# Patient Record
Sex: MALE | Race: WHITE
[De-identification: names, ages, dates, MRNs, and addresses within clinical notes are randomized per-mention and may not be internally consistent; named-entity substitution may affect disease eponyms.]

---

## 2018-02-25 ENCOUNTER — HOSPITAL ENCOUNTER (EMERGENCY)
Dept: HOSPITAL 46 - ED | Age: 28
Discharge: HOME | End: 2018-02-25
Payer: COMMERCIAL

## 2018-02-25 VITALS — WEIGHT: 159.99 LBS | HEIGHT: 69 IN | BODY MASS INDEX: 23.7 KG/M2

## 2018-02-25 DIAGNOSIS — K52.9: ICD-10-CM

## 2018-02-25 DIAGNOSIS — E86.0: Primary | ICD-10-CM

## 2018-02-25 DIAGNOSIS — F17.200: ICD-10-CM

## 2018-03-02 ENCOUNTER — HOSPITAL ENCOUNTER (INPATIENT)
Dept: HOSPITAL 46 - ED | Age: 28
LOS: 2 days | Discharge: HOME | DRG: 419 | End: 2018-03-04
Attending: SURGERY | Admitting: SURGERY
Payer: COMMERCIAL

## 2018-03-02 VITALS — HEIGHT: 69 IN | BODY MASS INDEX: 23.55 KG/M2 | WEIGHT: 159 LBS

## 2018-03-02 DIAGNOSIS — K66.0: ICD-10-CM

## 2018-03-02 DIAGNOSIS — K80.00: Primary | ICD-10-CM

## 2018-03-02 DIAGNOSIS — E86.0: ICD-10-CM

## 2018-03-03 PROCEDURE — 0FT44ZZ RESECTION OF GALLBLADDER, PERCUTANEOUS ENDOSCOPIC APPROACH: ICD-10-PCS | Performed by: SURGERY

## 2018-03-03 PROCEDURE — 0DNU4ZZ RELEASE OMENTUM, PERCUTANEOUS ENDOSCOPIC APPROACH: ICD-10-PCS | Performed by: SURGERY

## 2018-03-03 PROCEDURE — BF14YZZ FLUOROSCOPY OF GALLBLADDER, BILE DUCTS AND PANCREATIC DUCTS USING OTHER CONTRAST: ICD-10-PCS | Performed by: SURGERY

## 2018-03-08 NOTE — OR
[facility]
                                    [address], [place]  [address]
_________________________________________________________________________________________
                                                                 Signed   
 
 
DATE OF OPERATION:
03/03/2018
 
SURGEON:
Yoel Henao MD
 
PREOPERATIVE DIAGNOSIS:
Acute acalculous cholecystitis.
 
POSTOPERATIVE DIAGNOSIS:
Acute acalculous cholecystitis.
 
PROCEDURES:
1. Laparoscopic cholecystectomy with intraoperative cholangiogram.
2. Laparoscopic lysis of adhesions.
3. Surgeon-directed fluoroscopy.
 
ANESTHESIA:
General endotracheal, Yoel Joyner CRNA and local 10 mL of 0.25% Marcaine with
epinephrine. 
 
INDICATION:
This 28-year-old white male was admitted through the emergency room yesterday with
severe right upper abdominal pain.  He has had pain and problems for the past 2 weeks,
initially thought likely to the flu.  The gallbladder ultrasound performed in his
evaluation in the emergency room confirmed an edematous gallbladder with pericholecystic
fluid, but no stones and possible sludge.  The liver enzymes were relatively normal.  He
was given intravenous fluids, parenteral pain medication, IV antibiotics and so forth
and is admitted at this time to undergo cholecystectomy preferred by laparoscopic
approach.  The risks of bleeding, infection, bile duct injury, need for open procedure,
failure to cure his symptoms and other unforeseen complications were reviewed in detail
with him and his significant other.  He wishes to proceed. 
 
FINDINGS:
Indeed, the gallbladder was tensed and distended and acutely inflamed.  There was
pericholecystic fluid.  The liver itself was normal.  Impressive edema of the
infundibulum of the gallbladder was noted.  The gallbladder once excised showed chronic
inflammatory changes in the mucosa, but no sign of stones or neoplasm.  Intraoperative
cholangiogram was normal.  There were no other findings of concern, though he did have
omental adhesions in the area of the right lower abdomen related to prior appendectomy I
performed on him nearly 20 years ago.  These were taken down with sharp dissection. 
 
 
    Electronically Signed By: YOEL HENAO MD  03/08/18 1412
_________________________________________________________________________________________
PATIENT NAME:     MESSI WILLOUGHBY                
MEDICAL RECORD #: [id number]            OPERATIVE REPORT              
          ACCT #: [id number]  
DATE OF BIRTH:   [birth year]            REPORT #: [id number]      
PHYSICIAN:        YOEL HENAO MD                 
PCP:              DEMETRIA MILLS                 
REPORT IS CONFIDENTIAL AND NOT TO BE RELEASED WITHOUT AUTHORIZATION
 
 
                                  [facility]
                                    [address] [place] [facility]
                                  [place], [place]  [address]
_________________________________________________________________________________________
                                                                 Signed   
 
 
DESCRIPTION OF PROCEDURE:
The patient was brought to the operating room, given a general endotracheal anesthetic.
Preoperative antibiotic Ancef was given by the anesthetist.  Sequential compression
device stockings were in place and heparin subcutaneously administered.  After
satisfactory general endotracheal anesthesia, the abdomen was clipped and prepared with
a chlorhexidine solution and draped sterilely.  An infraumbilical incision was made and
using an open Venecia cannula technique, pneumoperitoneum was achieved to a level of 14
mmHg of carbon dioxide gas.  Intraabdominal inspection showed no sign of ascites or
carcinomatosis.  The gallbladder was tensed and distended and quite obviously inflamed.
The liver was entirely normal.  Three additional trocars were placed in usual
configuration in the subxiphoid, right midclavicular line and anterior axillary line.
Prior to placement of the right-sided trocars omental adhesions, which were adherent to
his right lower abdominal incision from the past were taken down with sharp dissection
freeing the field for safe placement of the trocars.  The gallbladder was tensed, but
was able to be grasped and elevated cephalad.  The marked inflammation and edema of the
infundibulum were noted.  The infundibulum was grasped and retracted laterally and using
blunt and electrocautery dissection, the triangle of Calot was dissected free.
Impressive amount of inflammatory edema of the neck of the gallbladder was noted.
Ultimately, the cystic duct was well identified.  A clip was applied across gallbladder
cystic duct junction and a transverse choledochotomy made in the cystic duct.
Retrograde milking of the duct showed no sign of stone or other abnormality. 
 
An Mederos-type cholangiocatheter was passed in the cystic duct and using surgeon-directed
fluoroscopy, intraoperative cholangiography was undertaken showing free flow of contrast
in biliary tree with prompt emptying into the duodenum.  Good retrograde filling of the
proximal biliary tree was noted.  There was no sign of filling defect or biliary
anomaly. 
 
The catheter was removed and the cystic duct was triply clipped and divided.  The
gallbladder was dissected free in a retrograde fashion using electrocautery.  The
gallbladder was extracted through the infraumbilical port site without problem, opened
on the back table and found to have no stones or even sludge, but inflammatory changes
of the mucosa.  There was no sign of neoplasm.  Irrigation was undertaken in subhepatic
space.  There was no sign of bile leak, bleeding, or other problems.  Excess irrigation
fluid was suctioned free.  The trocars were removed under direct visualization.  Given
his relatively thin body habitus, easy closure of the infraumbilical fascial incision
was undertaken.  All wounds were infiltrated with 0.25% Marcaine with epinephrine for
postoperative analgesic benefit.  The skin was closed with interrupted 3-0 Vicryl. 
 
The patient then had a fair amount of coughing and straining, though still intubated and
palpably I could feel the sutures at the umbilicus "pop."  This was quite suggestive
obviously of closure dehiscence related to excessive straining.  On that basis, the
 
    Electronically Signed By: YOEL HENAO MD  03/08/18 1412
_________________________________________________________________________________________
PATIENT NAME:     MESSI WILLOUGHBY                
MEDICAL RECORD #: [id number]            OPERATIVE REPORT              
          ACCT #: [id number]  
DATE OF BIRTH:   [birth year]            REPORT #: [id number]      
PHYSICIAN:        YOEL HENAO MD                 
PCP:              DEMETRIA MILLS                 
REPORT IS CONFIDENTIAL AND NOT TO BE RELEASED WITHOUT AUTHORIZATION
 
 
                                  [address] [place], [place]  [address]
_________________________________________________________________________________________
                                                                 Signed   
 
 
patient was given anesthetic again, as he was still intubated and relaxation and
examination of the infraumbilical wound undertaken.  The sutures were removed and
complete dehiscence of the fascial edges was noted.  The herniated omentum was replaced
into the abdomen.  The fascial edges were reapproximated with interrupted 0 Vicryl
suture and subcutaneous layer reapproximated with interrupted 3-0 Vicryl and skin closed
once again with interrupted 2-0 Vicryl.  Steri-Strips were applied.  The patient was
extubated more deeply without coughing and had no further straining and no further
problem. 
 
The patient was ultimately taken to recovery room in good condition having suffered no
complications.  Sponge, needle, and instrument counts reported as correct x3. 
 
 
 
            ________________________________________
            MD SAGRARIO Garcia/CONSUELO
Job #:  [id number]
DD:  03/03/2018 11:21:18
DT:  03/03/2018 13:58:46
 
cc:            Jareth Jarrett DO
 
 
Copies:  JARETH TREADWELL JAMES ALAN DO
~
 
 
 
 
 
 
 
 
 
 
 
 
    Electronically Signed By: YOEL HENAO MD  03/08/18 1412
_________________________________________________________________________________________
PATIENT NAME:     MESSI WILLOUGHBY                
MEDICAL RECORD #: [id number]            OPERATIVE REPORT              
          ACCT #: [id number]  
DATE OF BIRTH:   [birth year]            REPORT #: [id number]      
PHYSICIAN:        YOEL HENAO MD                 
PCP:              DEMETRIA MILLS                 
REPORT IS CONFIDENTIAL AND NOT TO BE RELEASED WITHOUT AUTHORIZATION

## 2018-03-08 NOTE — HP
[facility]
                                    [address], [place]  [address]
_________________________________________________________________________________________
                                                                 Signed   
 
 
ADMISSION DATE:  
03/02/2018
 
REASON FOR ADMISSION:  
Acute acalculous cholecystitis.
 
HISTORY OF PRESENT ILLNESS:  
This 28-year-old white man has had about 2 weeks of epigastric and right subcostal pain
worsened after eating.  He was seen in the emergency room today and evaluated by Dr. Jarrett.  An ultrasound was performed showing findings consistent with acute acalculous
cholecystitis including pericholecystic fluid, a laminated thickened gallbladder wall,
but no evidence of stones.  There was some minimal sludge. 
 
The patient and his wife tell me that he was seen about a week ago in the emergency room
with dehydration and considered likely to have a viral illness akin to those suffered by
other family members at that time. 
 
His previous symptoms over the past 2 weeks, he has had some diarrhea it is noted, but
no myalgias, fever, or other problem.  His dominant problem even over the past 2 weeks
has been epigastric and right subcostal pain.  He denies subscapular pain on the right
side. 
 
He is admitted for further evaluation and care.
 
PAST MEDICAL HISTORY:  
Significant for appendectomy, which he tells me I performed when he was a child.  As
best he recalls, he was about 9 years old, which would be about 19 years ago.  I must
say time flies.  I did not recognize him from his childhood particularly. 
 
He has no other ongoing medical problems and takes no medications.  He does use
marijuana.  He does not smoke and denies alcohol use. 
 
Past medical history, in addition to appendectomy, has included kidney stones he says.
 
SOCIAL HISTORY:  
He is unemployed.  He was covered under [place] [employer].  He has, in aggregate five
children.  His life partner is Glenda Mata, who attends to him at this time.  His
primary provider is Dr. Alvarado oCles. 
 
REVIEW OF SYSTEMS:  
He denies any shortness of breath or chest pain.  He is rather thirsty.  He has had no
hematemesis or blood per rectum.  Denies dysuria or hematuria. 
 
    Electronically Signed By: YOEL HENAO MD  03/08/18 1412
_________________________________________________________________________________________
PATIENT NAME:     MESSI WILLOUGHBY                
MEDICAL RECORD #: [id number]            HISTORY AND PHYSICAL          
          ACCT #: [id number]  
DATE OF BIRTH:   [birth year]            REPORT #: [id number]      
PHYSICIAN:        YOEL HENAO MD                 
PCP:              ALVARADO COLES                 
REPORT IS CONFIDENTIAL AND NOT TO BE RELEASED WITHOUT AUTHORIZATION
 
 
                                  [facility]
                                    [address], [place]  [address]
_________________________________________________________________________________________
                                                                 Signed   
 
 
 
PHYSICAL EXAMINATION:
GENERAL:  A relatively muscular white man, who does not look systemically toxic at this
time.  He is rather pale in color and has several tattoos.  He shows no evidence of
jaundice.  Mucous membranes are markedly dry.  Trachea is midline. 
CHEST:  Clear. 
HEART:  Regular without murmur. 
ABDOMEN:  Nondistended.  There is no sign of ascites.  He does have tenderness in the
epigastric and right subcostal areas. 
EXTREMITIES:  No clubbing, cyanosis, or edema.
 
LABORATORY DATA:  
Show white count of 7.3, hematocrit 41.9, platelets 250,000.  Electrolytes are normal.
Liver enzymes showed bilirubin of 0.5, alkaline phosphatase of 59, AST of 30, ALT 39,
alkaline phosphatase of 59.  His abdominal ultrasound was performed shows a thickened
gallbladder wall, but no evidence of stone.  It is uncertain if there is debris within
the lumen of the gallbladder. 
 
ASSESSMENT:  
The patient has clinical and ultrasonographic evidence of acute acalculous
cholecystitis.  He does have clinical dehydration at this time as well.  We discussed in
detail the pathophysiology of biliary disease both calculous and acalculous.  I would
recommend fluid resuscitation, parenteral pain medication, avoidance of oral intake
other than some sips of liquids for comfort, anticipating more fluid resuscitation and
cholecystectomy preferred by a laparoscopic approach.  The risks of bleeding, infection,
bile duct injury, need for open procedure, and of course failure to cure of his symptoms
were reviewed in detail. 
 
It is more likely that he has had acute cholecystitis for the past 2 weeks and the flu
as was presumed previously.  We will anticipate operative intervention when the patient
adequately resuscitated. 
 
 
 
            ________________________________________
            Yoel Henao MD 
 
 
JM/MODL
Job #:  [id number]
DD:  03/02/2018 16:23:02
DT:  03/02/2018 22:34:22
 
    Electronically Signed By: YOEL HENAO MD  03/08/18 1412
_________________________________________________________________________________________
PATIENT NAME:     MESSI WILLOUGHBY                
MEDICAL RECORD #: [id number]            HISTORY AND PHYSICAL          
          ACCT #: [id number]  
DATE OF BIRTH:   [birth year]            REPORT #: [id number]      
PHYSICIAN:        YOEL HENAO MD                 
PCP:              ALVARADO COLES                 
REPORT IS CONFIDENTIAL AND NOT TO BE RELEASED WITHOUT AUTHORIZATION
 
 
                                  [facility]
                                    [address] [place] [address]  [address]
_________________________________________________________________________________________
                                                                 Signed   
 
 
 
cc:            Alvarado Jarrett DO
 
 
Copies:  ALVARADO COLES JAMES ALAN DO
~
 
 
 
 
 
 
 
 
 
 
 
 
 
 
 
 
 
 
 
 
 
 
 
 
 
 
 
 
 
 
 
 
 
 
    Electronically Signed By: YEOL HENAO MD  03/08/18 1412
_________________________________________________________________________________________
PATIENT NAME:     MESSI WILLOUGHBY                
MEDICAL RECORD #: [id number]            HISTORY AND PHYSICAL          
          ACCT #: [id number]  
DATE OF BIRTH:   [birth year]            REPORT #: [id number]      
PHYSICIAN:        YOEL HENAO MD                 
PCP:              ALVARADO COLES                 
REPORT IS CONFIDENTIAL AND NOT TO BE RELEASED WITHOUT AUTHORIZATION

## 2018-03-08 NOTE — DS
[facility]
                                    [address] [place], [place]  [address]
_________________________________________________________________________________________
                                                                 Signed   
 
 
ADMISSION DATE:  03/02/2018
 
DISCHARGE DATE:  03/04/2018
 
REASON FOR ADMISSION:
This 28-year-old white man was admitted to the emergency room on March 2nd with severe
right upper abdominal pain.  He has had pain more or less for over 2 weeks.  He was
thought to have the flu initially.  His evaluation in the emergency room included
gallbladder ultrasound confirming marked inflammation and edema of the gallbladder wall,
but no stones.  There is pericholecystic fluid as well.  There is no sign of ductal
dilatation.  He was admitted for further evaluation and care, which included intravenous
antibiotic administration, parental pain medication, fluid resuscitation, and
anticipating cholecystectomy. 
 
PAST MEDICAL HISTORY:
Includes appendectomy, which I performed at age 9 (19 years ago).  He is otherwise in
good health. 
 
PERTINENT PHYSICAL EXAMINATION:
GENERAL:  Muscular white male with multiple tattoos, accompanied by his significant
other girlfriend. 
HEENT:  Trachea is midline. 
CHEST:  Clear. 
HEART:  Regular without murmur. 
ABDOMEN:  Nondistended.  Marked tenderness is noted in the epigastric and right
subcostal area.  There is no ascites. 
EXTREMITIES:  Show no clubbing, cyanosis, or edema.
 
LABORATORY DATA:
His white count was only mildly elevated to 11.5.  Bilirubin was normal.  Alkaline
phosphatase was normal.  AST and ALT slightly elevated. 
 
HOSPITAL COURSE:
He was fluid resuscitated, given intravenous antibiotics and on March 3, 2018 on
Saturday, he underwent laparoscopic cholecystectomy with intraoperative cholangiogram.
He was found to have a markedly inflamed and thickened gallbladder with pericholecystic
inflammation.  The liver was normal.  The cholangiogram was normal.  The gallbladder
once opened showed chronic and subacute inflammatory change of the mucosa, but no sign
of stones or neoplasm. 
 
His postoperative course was completely unremarkable.  He is able to eat solid food soon
after operation.  By day of discharge, he is ambulating well and tolerating a regular
 
    Electronically Signed By: YOEL HENAO MD  03/08/18 1412
_________________________________________________________________________________________
PATIENT NAME:     MESSI WILLOUGHBY                
MEDICAL RECORD #: [id number]            DISCHARGE SUMMARY             
          ACCT #: [id number]  
DATE OF BIRTH:   [birth year]            REPORT #: [id number]      
PHYSICIAN:        YOEL HENAO MD                 
PCP:              DEMETRIA MILLS                 
REPORT IS CONFIDENTIAL AND NOT TO BE RELEASED WITHOUT AUTHORIZATION
 
 
                                  [facility]
                                    [address] [place], [place]  [address]
_________________________________________________________________________________________
                                                                 Signed   
 
 
diet.  Incisions are healing nicely.  He is having no problems. 
 
DISCHARGE MEDICATIONS:
His discharge medications will include:
1. Elmer 5/325, 1 to 2 p.o. q.4 hours p.r.n. pain, #10.
2. Ibuprofen 600 mg p.o. q.6 hours p.r.n. pain, #60.
3. Tylenol 650 mg p.o. q.6 hours p.r.n., #30. 
He will likely be able to discontinue Zofran and promethazine, which were prescribed
previously for his nausea problem. 
 
DISCHARGE DIAGNOSES:
1. Acute acalculous cholecystitis, status post laparoscopic cholecystectomy with
intraoperative cholangiogram on march 4, 2018. 
2. Distant history of appendectomy (open appendectomy, age 9).
 
FOLLOWUP PLAN:
He is to return to see me in approximately 4 weeks or so.  He is asked to avoid lifting
more than 20 pounds for the next 2 weeks.  He is permitted to shower tomorrow.  We will
keep Steri-Strips on. 
 
 
 
            ________________________________________
            Yoel Henao MD 
 
 
JM/MODL
Job #:  [id number]
DD:  03/04/2018 11:20:12
DT:  03/04/2018 12:39:25
 
cc:            HANNY Guerrero Dr.
 
 
Copies:  JUANA TREADWELL
 
 
 
 
    Electronically Signed By: YOEL HENAO MD  03/08/18 1412
_________________________________________________________________________________________
PATIENT NAME:     MESSI WILLOUGHBY                
MEDICAL RECORD #: [id number]            DISCHARGE SUMMARY             
          ACCT #: [id number]  
DATE OF BIRTH:   [birth year]            REPORT #: [id number]      
PHYSICIAN:        YOEL HENAO MD                 
PCP:              DEMETRIA MILLS                 
REPORT IS CONFIDENTIAL AND NOT TO BE RELEASED WITHOUT AUTHORIZATION
 
 
                                  [facility]
                                    [address] [place], [place]  [address]
_________________________________________________________________________________________
                                                                 Signed   
 
 
         TAMICA YEUNG

 
 
 
 
 
 
 
 
 
 
 
 
 
 
 
 
 
 
 
 
 
 
 
 
 
 
 
 
 
 
 
 
 
 
 
 
 
 
 
 
 
    Electronically Signed By: YOEL HENAO MD  03/08/18 1412
_________________________________________________________________________________________
PATIENT NAME:     MESSI WILLOUGHBY                
MEDICAL RECORD #: [id number]            DISCHARGE SUMMARY             
          ACCT #: [id number]  
DATE OF BIRTH:   [birth year]            REPORT #: [id number]      
PHYSICIAN:        YOEL HENAO MD                 
PCP:              DEMETRIA MILLS                 
REPORT IS CONFIDENTIAL AND NOT TO BE RELEASED WITHOUT AUTHORIZATION

## 2018-03-30 ENCOUNTER — HOSPITAL ENCOUNTER (EMERGENCY)
Dept: HOSPITAL 46 - ED | Age: 28
Discharge: HOME | End: 2018-03-30
Payer: COMMERCIAL

## 2018-03-30 VITALS — BODY MASS INDEX: 23.62 KG/M2 | HEIGHT: 70 IN | WEIGHT: 164.99 LBS

## 2018-03-30 DIAGNOSIS — M54.5: Primary | ICD-10-CM

## 2018-07-10 ENCOUNTER — HOSPITAL ENCOUNTER (OUTPATIENT)
Dept: HOSPITAL 46 - DS | Age: 28
Discharge: HOME | End: 2018-07-10
Attending: ORTHOPAEDIC SURGERY
Payer: COMMERCIAL

## 2018-07-10 VITALS — WEIGHT: 170 LBS | HEIGHT: 67 IN | BODY MASS INDEX: 26.68 KG/M2

## 2018-07-10 DIAGNOSIS — L72.0: Primary | ICD-10-CM

## 2018-07-10 PROCEDURE — 0HBGXZZ EXCISION OF LEFT HAND SKIN, EXTERNAL APPROACH: ICD-10-PCS | Performed by: ORTHOPAEDIC SURGERY

## 2018-07-10 NOTE — NUR
07/10/18 1230 Cherise Staley 1218 PT ARRIVED IN PACU VERY SLEEPY. AROUSES TO VERBAL AND TACTILE
STIMULI, THEN FALLS RIGHT BACK TO SLEEP.

## 2018-07-10 NOTE — OR
[facility]
                                    [address] [place], [place]  [address]
_________________________________________________________________________________________
                                                                 Draft    
 
 
DATE OF OPERATION:
07/10/2018
 
SURGEON:
Antoine Valdes MD
 
PREOPERATIVE DIAGNOSIS:
Mass left index finger.
 
POSTOPERATIVE DIAGNOSIS:
Mass left index finger, suspect epithelial inclusion cyst.
 
ANESTHESIA:
[place] block.
 
SPECIMENS AND COMPLICATIONS:
There were no complications.  We did send the excised mass as a single specimen.
 
WHAT WAS DONE:
The patient was taken to the operating room.  After anesthesia was induced and the
patient gently sedated, he was positioned, prepped and draped in a routine sterile
fashion.  Using a modified Joanne incision over the middle phalanx of the left index
finger based on the ulnar side, the skin was divided sharply.  A full-thickness flap was
then raised.  The ulnar neurovascular bundle was identified and gently retracted and the
mass itself appeared to be an epithelial inclusion cyst.  We then able to nucleate the
cyst from the surrounding tissue and delivered it off the field.  The wound was gently
irrigated, closed in standard fashion.  A sterile dressing applied.  He was awakened and
taken to the recovery room where he arrived in stable condition.  Counts were correct
and antibiotic protocols were followed. 
 
 
 
            ________________________________________
            MD ALLISON Sigala/MODL
Job #:  [id number]
DD:  07/10/2018 12:23:31
DT:  07/10/2018 17:30:14
 
 
 
                                                                                    
_________________________________________________________________________________________
PATIENT NAME:     MESSI WILLOUGHBY                
MEDICAL RECORD #: [id number]            OPERATIVE REPORT              
          ACCT #: [id number]  
DATE OF BIRTH:   [birth year]            REPORT #: [id number]      
PHYSICIAN:        ANTOINE VALDES MD      
PCP:              [facility]           
REPORT IS CONFIDENTIAL AND NOT TO BE RELEASED WITHOUT AUTHORIZATION
 
 
                                  [address]  [address]
_________________________________________________________________________________________
                                                                 Draft    
 
 
Copies:                                
~
 
 
 
 
 
 
 
 
 
 
 
 
 
 
 
 
 
 
 
 
 
 
 
 
 
 
 
 
 
 
 
 
 
 
 
 
 
 
 
 
 
                                                                                    
_________________________________________________________________________________________
PATIENT NAME:     MESSI WILLOUGHBY                
MEDICAL RECORD #: [id number]            OPERATIVE REPORT              
          ACCT #: [id number]  
DATE OF BIRTH:   [birth year]            REPORT #: [id number]      
PHYSICIAN:        ANTOINE VALDES MD      
PCP:              [facility]           
REPORT IS CONFIDENTIAL AND NOT TO BE RELEASED WITHOUT AUTHORIZATION

## 2018-08-18 ENCOUNTER — HOSPITAL ENCOUNTER (EMERGENCY)
Dept: HOSPITAL 46 - ED | Age: 28
End: 2018-08-18
Payer: COMMERCIAL

## 2018-08-18 VITALS — HEIGHT: 67 IN | WEIGHT: 170 LBS | BODY MASS INDEX: 26.68 KG/M2

## 2018-08-18 DIAGNOSIS — Z87.891: ICD-10-CM

## 2018-08-18 DIAGNOSIS — N45.1: Primary | ICD-10-CM

## 2019-01-13 ENCOUNTER — HOSPITAL ENCOUNTER (EMERGENCY)
Dept: HOSPITAL 46 - ED | Age: 29
Discharge: HOME | End: 2019-01-13
Payer: COMMERCIAL

## 2019-01-13 VITALS — HEIGHT: 67 IN | BODY MASS INDEX: 26.68 KG/M2 | WEIGHT: 170 LBS

## 2019-01-13 DIAGNOSIS — Z87.891: ICD-10-CM

## 2019-01-13 DIAGNOSIS — R56.9: Primary | ICD-10-CM
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
## 2019-01-13 DIAGNOSIS — Z23: ICD-10-CM

## 2019-01-13 DIAGNOSIS — Z87.442: ICD-10-CM

## 2019-01-13 DIAGNOSIS — S01.21XA: ICD-10-CM

## 2019-01-13 PROCEDURE — 0WQ2XZZ REPAIR FACE, EXTERNAL APPROACH: ICD-10-PCS

## 2019-01-13 PROCEDURE — G0480 DRUG TEST DEF 1-7 CLASSES: HCPCS

## 2019-03-08 ENCOUNTER — HOSPITAL ENCOUNTER (EMERGENCY)
Dept: HOSPITAL 46 - ED | Age: 29
Discharge: HOME | End: 2019-03-08
Payer: COMMERCIAL

## 2019-03-08 VITALS — WEIGHT: 184.99 LBS | BODY MASS INDEX: 29.03 KG/M2 | HEIGHT: 67 IN

## 2019-03-08 DIAGNOSIS — Z87.891: ICD-10-CM

## 2019-03-08 DIAGNOSIS — Z87.442: ICD-10-CM

## 2019-03-08 DIAGNOSIS — R51: Primary | ICD-10-CM

## 2019-03-08 DIAGNOSIS — Z79.899: ICD-10-CM

## 2020-06-07 NOTE — XMS
Encounter Summary
  Created on: 2020
 
 Yony Twin Zane
 External Reference #: [id number]
 : [birth year]
 Sex: Male
 
 Demographics
 
 
+-----------------------+--------------------+
| Address               | [address]          |
|                       | [place]  [address] |
+-----------------------+--------------------+
| Home Phone            | [phone]    |
+-----------------------+--------------------+
| Preferred Language    | Unknown            |
+-----------------------+--------------------+
| Marital Status        |             |
+-----------------------+--------------------+
| Sikh Affiliation | Unknown            |
+-----------------------+--------------------+
| Race                  | Unknown            |
+-----------------------+--------------------+
| Ethnic Group          | Unknown            |
+-----------------------+--------------------+
 
 
 Author
 
 
+--------------+--------------------------------------------+
| Author       | [facility]  |
|              | and [place]                                |
+--------------+--------------------------------------------+
| Organization | [facility]  |
|              | and [place]                                |
+--------------+--------------------------------------------+
| Address      | Unknown                                    |
+--------------+--------------------------------------------+
| Phone        | Unavailable                                |
+--------------+--------------------------------------------+
 
 
 
 Support
 
 
+------------------+--------------+--------------------+-----------------+
| Name             | Relationship | Address            | Phone           |
+------------------+--------------+--------------------+-----------------+
| Glenda Bhakta | ECON         | [address]          | [phone] |
|                  |              | [place]  [address] |                 |
+------------------+--------------+--------------------+-----------------+
 
 
 
 
 Care Team Providers
 
 
+-----------------------+------+-----------------+
| Care Team Member Name | Role | Phone           |
+-----------------------+------+-----------------+
| Brittney Wall MD | PCP  | [phone] |
+-----------------------+------+-----------------+
 
 
 
 Encounter Details
 
 
+--------+-------------+----------------------+--------------------+-------------+
| Date   | Type        | Department           | Care Team          | Description |
+--------+-------------+----------------------+--------------------+-------------+
| / | Orders Only |   [facility]     |   Conversion       |             |
| 2019   |             | CONVERSION DEP  888  | Transaction,       |             |
|        |             | [facility]O           | Provider Unknown   |             |
|        |             | [facility]         | [phone]       |             |
|        |             | [address]           | [phone] (Fax) |             |
|        |             | [phone]         |                    |             |
+--------+-------------+----------------------+--------------------+-------------+
 
 
 
 Social History
 
 
+----------------+-------+-----------+--------+------+
| Tobacco Use    | Types | Packs/Day | Years  | Date |
|                |       |           | Used   |      |
+----------------+-------+-----------+--------+------+
| Never Assessed |       |           |        |      |
+----------------+-------+-----------+--------+------+
 
 
 
+------------------+---------------+
| Sex Assigned at  | Date Recorded |
| Birth            |               |
+------------------+---------------+
| Not on file      |               |
+------------------+---------------+
 
 
 
+----------------+-------------+-------------+
| Job Start Date | Occupation  | Industry    |
+----------------+-------------+-------------+
| Not on file    | Not on file | Not on file |
+----------------+-------------+-------------+
 
 
 
+----------------+--------------+------------+
| Travel History | Travel Start | Travel End |
+----------------+--------------+------------+
 
 
 
 
+-------------------------------------+
| No recent travel history available. |
+-------------------------------------+
 documented as of this encounter
 
 Plan of Treatment
 Not on filedocumented as of this encounter
 
 Visit Diagnoses
 Not on filedocumented in this encounter

## 2020-06-07 NOTE — XMS
Encounter Summary
  Created on: 2020
 
 Yony Twin Zane
 External Reference #: [id number]
 : [birth year]
 Sex: Male
 
 Demographics
 
 
+-----------------------+--------------------+
| Address               | [address]          |
|                       | [place]  [address] |
+-----------------------+--------------------+
| Home Phone            | [phone]    |
+-----------------------+--------------------+
| Preferred Language    | Unknown            |
+-----------------------+--------------------+
| Marital Status        |             |
+-----------------------+--------------------+
| Oriental orthodox Affiliation | Unknown            |
+-----------------------+--------------------+
| Race                  | Unknown            |
+-----------------------+--------------------+
| Ethnic Group          | Unknown            |
+-----------------------+--------------------+
 
 
 Author
 
 
+--------------+--------------------------------------------+
| Author       | [facility]  |
|              | and [place]                                |
+--------------+--------------------------------------------+
| Organization | [facility]  |
|              | and [place]                                |
+--------------+--------------------------------------------+
| Address      | Unknown                                    |
+--------------+--------------------------------------------+
| Phone        | Unavailable                                |
+--------------+--------------------------------------------+
 
 
 
 Support
 
 
+------------------+--------------+--------------------+-----------------+
| Name             | Relationship | Address            | Phone           |
+------------------+--------------+--------------------+-----------------+
| Glenda Bhakta | ECON         | [address]          | [phone] |
|                  |              | [place]  [address] |                 |
+------------------+--------------+--------------------+-----------------+
 
 
 
 
 Care Team Providers
 
 
+-----------------------+------+-------------+
| Care Team Member Name | Role | Phone       |
+-----------------------+------+-------------+
 PCP  | Unavailable |
+-----------------------+------+-------------+
 
 
 
 Encounter Details
 
 
+--------+-----------+---------------------+----------------------+-------------+
| Date   | Type      | Department          | Care Team            | Description |
+--------+-----------+---------------------+----------------------+-------------+
| / | Hospital  |   [facility]      |   Malina Castro, |             |
|    | Encounter | [facility]OSP[facility]  |  [address]   |             |
|        |           | CENTER  [address]  | [place]  |             |
|        |           | [place]   | [address]  [phone]  |             |
|        |           | [address]          |  [phone] (Fax)  |             |
|        |           | [phone]        |                      |             |
+--------+-----------+---------------------+----------------------+-------------+
 
 
 
 Social History
 
 
+----------------+-------+-----------+--------+------+
| Tobacco Use    | Types | Packs/Day | Years  | Date |
|                |       |           | Used   |      |
+----------------+-------+-----------+--------+------+
| Never Assessed |       |           |        |      |
+----------------+-------+-----------+--------+------+
 
 
 
+------------------+---------------+
| Sex Assigned at  | Date Recorded |
| Birth            |               |
+------------------+---------------+
| Not on file      |               |
+------------------+---------------+
 
 
 
+----------------+-------------+-------------+
| Job Start Date | Occupation  | Industry    |
+----------------+-------------+-------------+
| Not on file    | Not on file | Not on file |
+----------------+-------------+-------------+
 
 
 
+----------------+--------------+------------+
| Travel History | Travel Start | Travel End |
+----------------+--------------+------------+
 
 
 
 
+-------------------------------------+
| No recent travel history available. |
+-------------------------------------+
 documented as of this encounter
 
 Plan of Treatment
 Not on filedocumented as of this encounter
 
 Visit Diagnoses
 Not on filedocumented in this encounter

## 2020-06-07 NOTE — XMS
Clinical Summary
  Created on: 2020
 
 Twin Bhakta
 External Reference #: [id number]
 : 88
 Sex: Male
 
 Demographics
 
 
+-----------------------+--------------------+
| Address               | [address]          |
|                       | [place]  [address] |
+-----------------------+--------------------+
| Home Phone            | [phone]    |
+-----------------------+--------------------+
| Preferred Language    | Unknown            |
+-----------------------+--------------------+
| Marital Status        |             |
+-----------------------+--------------------+
| Orthodox Affiliation | Unknown            |
+-----------------------+--------------------+
| Race                  | Unknown            |
+-----------------------+--------------------+
| Ethnic Group          | Unknown            |
+-----------------------+--------------------+
 
 
 Author
 
 
+--------------+--------------------------------------------+
| Author       | [facility]  |
|              | and [place]                                |
+--------------+--------------------------------------------+
| Organization | [facility]  |
|              | and [place]                                |
+--------------+--------------------------------------------+
| Address      | Unknown                                    |
+--------------+--------------------------------------------+
| Phone        | Unavailable                                |
+--------------+--------------------------------------------+
 
 
 
 Support
 
 
+------------------+--------------+--------------------+-----------------+
| Name             | Relationship | Address            | Phone           |
+------------------+--------------+--------------------+-----------------+
| Glenda Bhakta | ECON         | [address]          | [phone] |
|                  |              | [place]  [address] |                 |
+------------------+--------------+--------------------+-----------------+
 
 
 
 
 Care Team Providers
 
 
+-----------------------+------+-----------------+
| Care Team Member Name | Role | Phone           |
+-----------------------+------+-----------------+
| Brittney Wall MD | PCP  | [phone] |
+-----------------------+------+-----------------+
 
 
 
 Allergies
 No Known Allergies
 
 Medications
 
 
+---------------------+---------------------+-----------+---------+------+------+-------+
| Medication          | Sig                 | Dispensed | Refills | Star | End  | Statu |
|                     |                     |           |         | t    | Date | s     |
|                     |                     |           |         | Date |      |       |
+---------------------+---------------------+-----------+---------+------+------+-------+
|   ibuprofen         | take 1 tablet by    |           | 0       | 02/2 |      | Activ |
| (ADVIL,MOTRIN) 600  | mouth with food or  |           |         | 5/20 |      | e     |
| MG tablet           | milk three times a  |           |         | 19   |      |       |
|                     | day if needed       |           |         |      |      |       |
+---------------------+---------------------+-----------+---------+------+------+-------+
 
 
 
 Active Problems
 Not on file
 
 Family History
 
 
+----------+------+--------+----------+
| Relation | Name | Status | Comments |
+----------+------+--------+----------+
| Father   |      | Alive  |          |
+----------+------+--------+----------+
| Mother   |      | Alive  |          |
+----------+------+--------+----------+
 
 
 
 Social History
 
 
+---------------+-------+-----------+--------+------+
| Tobacco Use   | Types | Packs/Day | Years  | Date |
|               |       |           | Used   |      |
+---------------+-------+-----------+--------+------+
| Former Smoker |       |           |        |      |
+---------------+-------+-----------+--------+------+
 
 
 
+------------------+---------------+
| Sex Assigned at  | Date Recorded |
 
| Birth            |               |
+------------------+---------------+
| Not on file      |               |
+------------------+---------------+
 
 
 
+----------------+-------------+-------------+
| Job Start Date | Occupation  | Industry    |
+----------------+-------------+-------------+
| Not on file    | Not on file | Not on file |
+----------------+-------------+-------------+
 
 
 
+----------------+--------------+------------+
| Travel History | Travel Start | Travel End |
+----------------+--------------+------------+
 
 
 
+-------------------------------------+
| No recent travel history available. |
+-------------------------------------+
 
 
 
 Last Filed Vital Signs
 
 
+-------------------+----------------------+----------------------+----------+
| Vital Sign        | Reading              | Time Taken           | Comments |
+-------------------+----------------------+----------------------+----------+
| Blood Pressure    | 113/76               | 2019  2:04 PM  |          |
|                   |                      | PDT                  |          |
+-------------------+----------------------+----------------------+----------+
| Pulse             | 75                   | 2019  2:04 PM  |          |
|                   |                      | PDT                  |          |
+-------------------+----------------------+----------------------+----------+
| Temperature       | -                    | -                    |          |
+-------------------+----------------------+----------------------+----------+
| Respiratory Rate  | -                    | -                    |          |
+-------------------+----------------------+----------------------+----------+
| Oxygen Saturation | -                    | -                    |          |
+-------------------+----------------------+----------------------+----------+
| Inhaled Oxygen    | -                    | -                    |          |
| Concentration     |                      |                      |          |
+-------------------+----------------------+----------------------+----------+
| Weight            | 82.6 kg (182 lb 3.2  | 2019  2:04 PM  |          |
|                   | oz)                  | PDT                  |          |
+-------------------+----------------------+----------------------+----------+
| Height            | 170.2 cm (5' 7")     | 2019  2:04 PM  |          |
|                   |                      | PDT                  |          |
+-------------------+----------------------+----------------------+----------+
| Body Mass Index   | 28.54                | 2019  2:04 PM  |          |
|                   |                      | PDT                  |          |
+-------------------+----------------------+----------------------+----------+
 
 
 
 
 Plan of Treatment
 
 
+---------------------+-----------+-----------+----------+
| Health Maintenance  | Due Date  | Last Done | Comments |
+---------------------+-----------+-----------+----------+
| Vaccine:            |  |           |          |
| Dtap/Tdap/Td (1 -   | 0         |           |          |
| Tdap)               |           |           |          |
+---------------------+-----------+-----------+----------+
| Vaccine: Influenza  |  |           |          |
| (Season Ended)      | 0         |           |          |
+---------------------+-----------+-----------+----------+
 
 
 
 Results
 Not on filefrom Last 3 Months
 
 Insurance
 
 
+-------------------+--------+-------------+--------+-------------+---------+--------+
| Payer             | Benefi | Subscriber  | Effect | Phone       | Address | Type   |
|                   | t Plan | ID          | nimisha    |             |         |        |
|                   |  /     |             | Dates  |             |         |        |
|                   | Group  |             |        |             |         |        |
+-------------------+--------+-------------+--------+-------------+---------+--------+
| [employer]  | [employer]   | [id number]    | 10/4/2 | [phone] |         | [employer] |
| MEDICAID HMO      | HEALTH |             | 019-Pr | 1           |         | id     |
|                   |  MDCD  |             | esent  |             |         |        |
|                   | HMO OR |             |        |             |         |        |
+-------------------+--------+-------------+--------+-------------+---------+--------+
 
 
 
+----------------+--------+-------------+--------+-------------+-------------------+
| Guarantor Name | Accoun | Relation to | Date   | Phone       | Billing Address   |
|                | t Type |  Patient    | of     |             |                   |
|                |        |             | Birth  |             |                   |
+----------------+--------+-------------+--------+-------------+-------------------+
| Twin Bhakta  | Person | Self        | / |             |   [address]       |
| [place]       | al/Fam |             |    | [phone] | [place] [address] |
|                | rasha[place]    |             |        | 4 (Home)    |                   |
+----------------+--------+-------------+--------+-------------+-------------------+
 
 
 
 Advance Directives
 
 
+-----------+---------------+----------------+-------------+
| Type      | Date Recorded | Patient        | Explanation |
|           |               | Representative |             |
+-----------+---------------+----------------+-------------+
| Power of  |               |                |             |
|   |               |                |             |
+-----------+---------------+----------------+-------------+
| Advance   |               |                |             |
| Directive |               |                |             |
 
+-----------+---------------+----------------+-------------+

## 2020-06-07 NOTE — XMS
Encounter Summary
  Created on: 2020
 
 Yony Twin Zane
 External Reference #: [id number]
 : 88
 Sex: Male
 
 Demographics
 
 
+-----------------------+--------------------+
| Address               | [address]          |
|                       | [place]  [address] |
+-----------------------+--------------------+
| Home Phone            | [phone]    |
+-----------------------+--------------------+
| Preferred Language    | Unknown            |
+-----------------------+--------------------+
| Marital Status        |             |
+-----------------------+--------------------+
| Scientology Affiliation | Unknown            |
+-----------------------+--------------------+
| Race                  | Unknown            |
+-----------------------+--------------------+
| Ethnic Group          | Unknown            |
+-----------------------+--------------------+
 
 
 Author
 
 
+--------------+--------------------------------------------+
| Author       | [facility]  |
|              | and [place]                                |
+--------------+--------------------------------------------+
| Organization | [facility]  |
|              | and [place]                                |
+--------------+--------------------------------------------+
| Address      | Unknown                                    |
+--------------+--------------------------------------------+
| Phone        | Unavailable                                |
+--------------+--------------------------------------------+
 
 
 
 Support
 
 
+------------------+--------------+--------------------+-----------------+
| Name             | Relationship | Address            | Phone           |
+------------------+--------------+--------------------+-----------------+
| Glenda Bhakta | ECON         | [address]          | [phone] |
|                  |              | [place]  [id number] |                 |
+------------------+--------------+--------------------+-----------------+
 
 
 
 
 Care Team Providers
 
 
+-----------------------+------+-------------+
| Care Team Member Name | Role | Phone       |
+-----------------------+------+-------------+
 PCP  | Unavailable |
+-----------------------+------+-------------+
 
 
 
 Encounter Details
 
 
+--------+-----------+--------------------+--------------------+----------------------+
| Date   | Type      | Department         | Care Team          | Description          |
+--------+-----------+--------------------+--------------------+----------------------+
| / | Hospital  |   [facility]   |   Conversion       | Seizure (HCC);       |
| 2019   | Encounter | CENTER             | Transaction,       | Syncope, unspecified |
|        |           | NEURODIAGNOSTICS   | Provider Unknown   |  syncope type        |
|        |           | [address] [facility]      |        |                      |
|        |           | [place], [place]       |  (Fax) |                      |
|        |           | [address]         |                    |                      |
|        |           | [phone]       |                    |                      |
+--------+-----------+--------------------+--------------------+----------------------+
 
 
 
 Social History
 
 
+----------------+-------+-----------+--------+------+
| Tobacco Use    | Types | Packs/Day | Years  | Date |
|                |       |           | Used   |      |
+----------------+-------+-----------+--------+------+
| Never Assessed |       |           |        |      |
+----------------+-------+-----------+--------+------+
 
 
 
+------------------+---------------+
| Sex Assigned at  | Date Recorded |
| Birth            |               |
+------------------+---------------+
| Not on file      |               |
+------------------+---------------+
 
 
 
+----------------+-------------+-------------+
| Job Start Date | Occupation  | Industry    |
+----------------+-------------+-------------+
| Not on file    | Not on file | Not on file |
+----------------+-------------+-------------+
 
 
 
+----------------+--------------+------------+
| Travel History | Travel Start | Travel End |
+----------------+--------------+------------+
 
 
 
 
+-------------------------------------+
| No recent travel history available. |
+-------------------------------------+
 documented as of this encounter
 
 Plan of Treatment
 Not on filedocumented as of this encounter
 
 Visit Diagnoses
 
 
+-------------------------------------+
| Diagnosis                           |
+-------------------------------------+
|   Seizure (HCC)  Other convulsions  |
+-------------------------------------+
|   Syncope, unspecified syncope type |
+-------------------------------------+
 documented in this encounter

## 2020-06-07 NOTE — XMS
Clinical Summary
  Created on: 2020
 
 Twin Bhakta
 External Reference #: [id number]
 : 88
 Sex: Male
 
 Demographics
 
 
+-----------------------+--------------------+
| Address               | [address]          |
|                       | [place]  [address] |
+-----------------------+--------------------+
| Home Phone            | [phone]    |
+-----------------------+--------------------+
| Preferred Language    | Unknown            |
+-----------------------+--------------------+
| Marital Status        |             |
+-----------------------+--------------------+
| Mor[employer] Affiliation | Unknown            |
+-----------------------+--------------------+
| Race                  | Unknown            |
+-----------------------+--------------------+
| Ethnic Group          | Unknown            |
+-----------------------+--------------------+
 
 
 Author
 
 
+--------------+--------------------------------------------+
| Author       | [facility]  |
|              | and [place]                                |
+--------------+--------------------------------------------+
| Organization | [facility]  |
|              | and [place]                                |
+--------------+--------------------------------------------+
| Address      | Unknown                                    |
+--------------+--------------------------------------------+
| Phone        | Unavailable                                |
+--------------+--------------------------------------------+
 
 
 
 Support
 
 
+------------------+--------------+--------------------+-----------------+
| Name             | Relationship | Address            | Phone           |
+------------------+--------------+--------------------+-----------------+
| Glenda Bhakta | ECON         | [address]          | [phone] |
|                  |              | [place]  [address] |                 |
+------------------+--------------+--------------------+-----------------+
 
 
 
 
 Care Team Providers
 
 
+-----------------------+------+-----------------+
| Care Team Member Name | Role | Phone           |
+-----------------------+------+-----------------+
| Brittney Wall MD | PCP  | [phone] |
+-----------------------+------+-----------------+
 
 
 
 Allergies
 No Known Allergies
 
 Medications
 
 
+---------------------+---------------------+-----------+---------+------+------+-------+
| Medication          | Sig                 | Dispensed | Refills | Star | End  | Statu |
|                     |                     |           |         | t    | Date | s     |
|                     |                     |           |         | Date |      |       |
+---------------------+---------------------+-----------+---------+------+------+-------+
|   ibuprofen         | take 1 tablet by    |           | 0       | 02/2 |      | Activ |
| (ADVIL,MOTRIN) 600  | mouth with food or  |           |         | 5/20 |      | e     |
| MG tablet           | milk three times a  |           |         | 19   |      |       |
|                     | day if needed       |           |         |      |      |       |
+---------------------+---------------------+-----------+---------+------+------+-------+
 
 
 
 Active Problems
 Not on file
 
 Family History
 
 
+----------+------+--------+----------+
| Relation | Name | Status | Comments |
+----------+------+--------+----------+
| Father   |      | Alive  |          |
+----------+------+--------+----------+
| Mother   |      | Alive  |          |
+----------+------+--------+----------+
 
 
 
 Social History
 
 
+---------------+-------+-----------+--------+------+
| Tobacco Use   | Types | Packs/Day | Years  | Date |
|               |       |           | Used   |      |
+---------------+-------+-----------+--------+------+
| Former Smoker |       |           |        |      |
+---------------+-------+-----------+--------+------+
 
 
 
+------------------+---------------+
| Sex Assigned at  | Date Recorded |
 
| Birth            |               |
+------------------+---------------+
| Not on file      |               |
+------------------+---------------+
 
 
 
+----------------+-------------+-------------+
| Job Start Date | Occupation  | Industry    |
+----------------+-------------+-------------+
| Not on file    | Not on file | Not on file |
+----------------+-------------+-------------+
 
 
 
+----------------+--------------+------------+
| Travel History | Travel Start | Travel End |
+----------------+--------------+------------+
 
 
 
+-------------------------------------+
| No recent travel history available. |
+-------------------------------------+
 
 
 
 Last Filed Vital Signs
 
 
+-------------------+----------------------+----------------------+----------+
| Vital Sign        | Reading              | Time Taken           | Comments |
+-------------------+----------------------+----------------------+----------+
| Blood Pressure    | 113/76               | 2019  2:04 PM  |          |
|                   |                      | PDT                  |          |
+-------------------+----------------------+----------------------+----------+
| Pulse             | 75                   | 2019  2:04 PM  |          |
|                   |                      | PDT                  |          |
+-------------------+----------------------+----------------------+----------+
| Temperature       | -                    | -                    |          |
+-------------------+----------------------+----------------------+----------+
| Respiratory Rate  | -                    | -                    |          |
+-------------------+----------------------+----------------------+----------+
| Oxygen Saturation | -                    | -                    |          |
+-------------------+----------------------+----------------------+----------+
| Inhaled Oxygen    | -                    | -                    |          |
| Concentration     |                      |                      |          |
+-------------------+----------------------+----------------------+----------+
| Weight            | 82.6 kg (182 lb 3.2  | 2019  2:04 PM  |          |
|                   | oz)                  | PDT                  |          |
+-------------------+----------------------+----------------------+----------+
| Height            | 170.2 cm (5' 7")     | 2019  2:04 PM  |          |
|                   |                      | PDT                  |          |
+-------------------+----------------------+----------------------+----------+
| Body Mass Index   | 28.54                | 2019  2:04 PM  |          |
|                   |                      | PDT                  |          |
+-------------------+----------------------+----------------------+----------+
 
 
 
 
 Plan of Treatment
 
 
+---------------------+-----------+-----------+----------+
| Health Maintenance  | Due Date  | Last Done | Comments |
+---------------------+-----------+-----------+----------+
| Vaccine:            |  |           |          |
| Dtap/Tdap/Td (1 -   | 0         |           |          |
| Tdap)               |           |           |          |
+---------------------+-----------+-----------+----------+
| Vaccine: Influenza  |  |           |          |
| (Season Ended)      | 0         |           |          |
+---------------------+-----------+-----------+----------+
 
 
 
 Results
 Not on filefrom Last 3 Months
 
 Insurance
 
 
+-------------------+--------+-------------+--------+-------------+---------+--------+
| Payer             | Benefi | Subscriber  | Effect | Phone       | Address | Type   |
|                   | t Plan | ID          | nimisha    |             |         |        |
|                   |  /     |             | Dates  |             |         |        |
|                   | Group  |             |        |             |         |        |
+-------------------+--------+-------------+--------+-------------+---------+--------+
| [employer]  | [employer]   | [id number]    | 10/4/2 | [phone] |         | [employer] |
| MEDICAID HMO      | HEALTH |             | 019-Pr | 1           |         | id     |
|                   |  MDCD  |             | esent  |             |         |        |
|                   | HMO OR |             |        |             |         |        |
+-------------------+--------+-------------+--------+-------------+---------+--------+
 
 
 
+----------------+--------+-------------+--------+-------------+-------------------+
| Guarantor Name | [employer]oun | Relation to | Date   | Phone       | Billing Address   |
|                | t Type |  Patient    | of     |             |                   |
|                |        |             | Birth  |             |                   |
+----------------+--------+-------------+--------+-------------+-------------------+
| Twin Bhakta  | Person | Self        | / |             |   [address]       |
| [place]       | al/Fam |             |    | [phone] | [place] [address] |
|                | [place]    |             |        | [address] (Home)    |                   |
+----------------+--------+-------------+--------+-------------+-------------------+
 
 
 
 Advance Directives
 
 
+-----------+---------------+----------------+-------------+
| Type      | Date Recorded | Patient        | Explanation |
|           |               | Representative |             |
+-----------+---------------+----------------+-------------+
| Power of  |               |                |             |
|   |               |                |             |
+-----------+---------------+----------------+-------------+
| Advance   |               |                |             |
| Directive |               |                |             |
 
+-----------+---------------+----------------+-------------+

## 2020-06-07 NOTE — XMS
Encounter Summary
  Created on: 2020
 
 Yony Twin Zane
 External Reference #: [id number]
 : 88
 Sex: Male
 
 Demographics
 
 
+-----------------------+--------------------+
| Address               | [address]          |
|                       | [place]  [address] |
+-----------------------+--------------------+
| Home Phone            | [phone]    |
+-----------------------+--------------------+
| Preferred Language    | Unknown            |
+-----------------------+--------------------+
| Marital Status        |             |
+-----------------------+--------------------+
| Gnosticism Affiliation | Unknown            |
+-----------------------+--------------------+
| Race                  | Unknown            |
+-----------------------+--------------------+
| Ethnic Group          | Unknown            |
+-----------------------+--------------------+
 
 
 Author
 
 
+--------------+--------------------------------------------+
| Author       | [facility]  |
|              | and [place]                                |
+--------------+--------------------------------------------+
| Organization | [facility]  |
|              | and [place]                                |
+--------------+--------------------------------------------+
| Address      | Unknown                                    |
+--------------+--------------------------------------------+
| Phone        | Unavailable                                |
+--------------+--------------------------------------------+
 
 
 
 Support
 
 
+------------------+--------------+--------------------+-----------------+
| Name             | Relationship | Address            | Phone           |
+------------------+--------------+--------------------+-----------------+
| Glenda Bhakta | ECON         | [address]          | [phone] |
|                  |              | [place]  [address] |                 |
+------------------+--------------+--------------------+-----------------+
 
 
 
 
 Care Team Providers
 
 
+-----------------------+------+-------------+
| Care Team Member Name | Role | Phone       |
+-----------------------+------+-------------+
 PCP  | Unavailable |
+-----------------------+------+-------------+
 
 
 
 Encounter Details
 
 
+--------+-----------+----------------------+----------------------+----------------------+
| Date   | Type      | Department           | Care Team            | Description          |
+--------+-----------+----------------------+----------------------+----------------------+
| / | Hospital  |   [facility]     |   Conversion         | Abnormal brain MRI;  |
| 2019   | Encounter | [facility]  [address] | Transaction,         | Spells of decreased  |
|        |           |  [address] | Provider Unknown     | attentiveness        |
|        |           |   ESVIN WONG       | [phone]         |                      |
|        |           | [address]           | [phone] (Fax)   |                      |
|        |           | [phone]         | Ebony Mccloud MD  |                      |
|        |           |                      |  [address] |                      |
|        |           |                      |   [address]            |                      |
|        |           |                      | [place] [address]  |                      |
|        |           |                      |  [phone]        |                      |
|        |           |                      | [phone] (Fax)   |                      |
+--------+-----------+----------------------+----------------------+----------------------+
 
 
 
 Social History
 
 
+----------------+-------+-----------+--------+------+
| Tobacco Use    | Types | Packs/Day | Years  | Date |
|                |       |           | Used   |      |
+----------------+-------+-----------+--------+------+
| Never Assessed |       |           |        |      |
+----------------+-------+-----------+--------+------+
 
 
 
+------------------+---------------+
| Sex Assigned at  | Date Recorded |
| Birth            |               |
+------------------+---------------+
| Not on file      |               |
+------------------+---------------+
 
 
 
+----------------+-------------+-------------+
| Job Start Date | Occupation  | Industry    |
+----------------+-------------+-------------+
| Not on file    | Not on file | Not on file |
+----------------+-------------+-------------+
 
 
 
 
+----------------+--------------+------------+
| Travel History | Travel Start | Travel End |
+----------------+--------------+------------+
 
 
 
+-------------------------------------+
| No recent travel history available. |
+-------------------------------------+
 documented as of this encounter
 
 Last Filed Vital Signs
 
 
+-------------------+------------------+----------------------+----------+
| Vital Sign        | Reading          | Time Taken           | Comments |
+-------------------+------------------+----------------------+----------+
| Blood Pressure    | -                | -                    |          |
+-------------------+------------------+----------------------+----------+
| Pulse             | -                | -                    |          |
+-------------------+------------------+----------------------+----------+
| Temperature       | -                | -                    |          |
+-------------------+------------------+----------------------+----------+
| Respiratory Rate  | -                | -                    |          |
+-------------------+------------------+----------------------+----------+
| Oxygen Saturation | -                | -                    |          |
+-------------------+------------------+----------------------+----------+
| Inhaled Oxygen    | -                | -                    |          |
| Concentration     |                  |                      |          |
+-------------------+------------------+----------------------+----------+
| Weight            | 81.6 kg (180 lb) | 2019  2:25 PM  |          |
|                   |                  | PDT                  |          |
+-------------------+------------------+----------------------+----------+
| Height            | -                | -                    |          |
+-------------------+------------------+----------------------+----------+
| Body Mass Index   | 28.19            | 2019 10:49 AM  |          |
|                   |                  | PDT                  |          |
+-------------------+------------------+----------------------+----------+
 documented in this encounter
 
 Medications at Time of Discharge
 
 
+---------------------+---------------------+-----------+---------+----------+----------+
| Medication          | Sig                 | Dispensed | Refills | Start    | End Date |
|                     |                     |           |         | Date     |          |
+---------------------+---------------------+-----------+---------+----------+----------+
|   ibuprofen         | take 1 tablet by    |           | 0       | 20 |          |
| (ADVIL,MOTRIN) 600  | mouth with food or  |           |         | 19       |          |
| MG tablet           | milk three times a  |           |         |          |          |
|                     | day if needed       |           |         |          |          |
+---------------------+---------------------+-----------+---------+----------+----------+
 documented as of this encounter
 
 Plan of Treatment
 Not on filedocumented as of this encounter
 
 Procedures
 
 
 
+-----------------+--------+-------------+----------------------+----------------------+
| Procedure Name  | Priori | Date/Time   | Associated Diagnosis | Comments             |
|                 | ty     |             |                      |                      |
+-----------------+--------+-------------+----------------------+----------------------+
| MRI BRAIN W WO  | Routin | 2019  |                      |   Results for this   |
| CONTRAST        | e      |  3:25 PM    |                      | procedure are in the |
|                 |        | PDT         |                      |  results section.    |
+-----------------+--------+-------------+----------------------+----------------------+
 documented in this encounter
 
 Results
 MRI Brain w wo Contrast (2019  3:25 PM PDT)
 
+----------+
| Specimen |
+----------+
|          |
+----------+
 
 
 
+-------------------------------------------------------------------+--------------+
| Impressions                                                       | Performed At |
+-------------------------------------------------------------------+--------------+
|   1. No evidence of acute ischemia.  2. Normal contrast enhanced  |              |
| appearance of the brain.  Signed by: MIGUEL Darby Richard  Sign  |              |
| Date/Time: 2019 7:48 AM                                     |              |
+-------------------------------------------------------------------+--------------+
 
 
 
+------------------------------------------------------------------------+--------------+
| Narrative                                                              | Performed At |
+------------------------------------------------------------------------+--------------+
|   MRI BRAIN WITHOUT AND WITH CONTRAST  CLINICAL INFORMATION:  Abnormal |              |
|  brain MRI Spells of decreased attentiveness  COMPARISON:  None        |              |
| PROCEDURE:  Sagittal T1, axial FLAIR, axial T2, axial T1, axial        |              |
| gradient  susceptibility, axial T1 enhanced, coronal T1 enhanced and   |              |
| axial DWI.  Contrast: 8.0ml gadavist IV.  FINDINGS:  Brain: No         |              |
| intracranial hemorrhage. No midline shift, pathologic  enhancement, or |              |
|  mass lesion.   No cerebral edema, restricted diffusion,  or evidence  |              |
| of acute infarct.  Ventricles and extra-axial fluid spaces: Normal.    |              |
| Sella, suprasellar cistern, and orbits: Normal.  Major vascular flow   |              |
| voids: Normal.  Calvarium and extracranial soft tissues: Normal.       |              |
| Paranasal sinuses and mastoid air cells: Mild mucosal thickening is    |              |
| seen of the bilateral maxillary sinus.                                 |              |
+------------------------------------------------------------------------+--------------+
 
 
 
+--------------------------------------------------------------------------------------+
| Procedure Note                                                                       |
+--------------------------------------------------------------------------------------+
|   Fadi [facility] Conversion - 2019 11:16 PM PDT  MRI BRAIN WITHOUT AND WITH CONTRAST |
| CLINICAL INFORMATION:                                                                |
| Abnormal brain MRI Spells of decreased attentiveness                                 |
| COMPARISON:                                                                          |
| None                                                                                 |
| PROCEDURE:                                                                           |
 
| Sagittal T1, axial FLAIR, axial T2, axial T1, axial gradient                         |
| susceptibility, axial T1 enhanced, coronal T1 enhanced and axial DWI.                |
| Contrast: 8.0ml gadavist IV.                                                         |
| FINDINGS:                                                                            |
| Brain: No intracranial hemorrhage. No midline shift, pathologic                      |
| enhancement, or mass lesion.  No cerebral edema, restricted diffusion,               |
| or evidence of acute infarct.                                                        |
| Ventricles and extra-axial fluid spaces: Normal.                                     |
| Sella, suprasellar cistern, and orbits: Normal.                                      |
| Major vascular flow voids: Normal.                                                   |
| Calvarium and extracranial soft tissues: Normal.                                     |
| Paranasal sinuses and mastoid air cells: Mild mucosal thickening is                  |
| seen of the bilateral maxillary sinus.                                               |
| IMPRESSION:                                                                          |
| 1. No evidence of acute ischemia.                                                    |
| 2. Normal contrast enhanced appearance of the brain.                                 |
| Signed by: MIGUEL Darby Richard                                                     |
| Sign Date/Time: 2019 7:48 AM                                                   |
+--------------------------------------------------------------------------------------+
 documented in this encounter
 
 Visit Diagnoses
 
 
+----------------------------------------------------------------------------------+
| Diagnosis                                                                        |
+----------------------------------------------------------------------------------+
|   Abnormal brain MRI  Nonspecific (abnormal) findings on radiological and other  |
| examination of skull and head                                                    |
+----------------------------------------------------------------------------------+
|   Spells of decreased attentiveness  Other general symptoms                      |
+----------------------------------------------------------------------------------+
 documented in this encounter

## 2020-06-07 NOTE — XMS
Encounter Summary
  Created on: 2020
 
 Yony Twin Zane
 External Reference #: [id number]
 : 88
 Sex: Male
 
 Demographics
 
 
+-----------------------+--------------------+
| Address               | [address]          |
|                       | [place]  [address] |
+-----------------------+--------------------+
| Home Phone            | [phone]    |
+-----------------------+--------------------+
| Preferred Language    | Unknown            |
+-----------------------+--------------------+
| Marital Status        |             |
+-----------------------+--------------------+
| Adventist Affiliation | Unknown            |
+-----------------------+--------------------+
| Race                  | Unknown            |
+-----------------------+--------------------+
| Ethnic Group          | Unknown            |
+-----------------------+--------------------+
 
 
 Author
 
 
+--------------+--------------------------------------------+
| Author       | [facility]  |
|              | and [place]                                |
+--------------+--------------------------------------------+
| Organization | [facility]  |
|              | and [place]                                |
+--------------+--------------------------------------------+
| Address      | Unknown                                    |
+--------------+--------------------------------------------+
| Phone        | Unavailable                                |
+--------------+--------------------------------------------+
 
 
 
 Support
 
 
+------------------+--------------+--------------------+-----------------+
| Name             | Relationship | Address            | Phone           |
+------------------+--------------+--------------------+-----------------+
| Glenda Bhakta | ECON         | [address]          | [phone] |
|                  |              | [place]  [address] |                 |
+------------------+--------------+--------------------+-----------------+
 
 
 
 
 Care Team Providers
 
 
+-----------------------+------+-------------+
| Care Team Member Name | Role | Phone       |
+-----------------------+------+-------------+
 PCP  | Unavailable |
+-----------------------+------+-------------+
 
 
 
 Encounter Details
 
 
+--------+-----------+---------------------+----------------------+-------------+
| Date   | Type      | Department          | Care Team            | Description |
+--------+-----------+---------------------+----------------------+-------------+
| / | Hospital  |   [facility]      |   Malina Castro, |             |
|    | Encounter | [facility]  |  [address]   |             |
|        |           | CENTER  [address]  | [place]  |             |
|        |           | [place]   | [address]  [phone]  |             |
|        |           | [address]          |  [phone] (Fax)  |             |
|        |           | [phone]        |                      |             |
+--------+-----------+---------------------+----------------------+-------------+
 
 
 
 Social History
 
 
+----------------+-------+-----------+--------+------+
| Tobacco Use    | Types | Packs/Day | Years  | Date |
|                |       |           | Used   |      |
+----------------+-------+-----------+--------+------+
| Never Assessed |       |           |        |      |
+----------------+-------+-----------+--------+------+
 
 
 
+------------------+---------------+
| Sex Assigned at  | Date Recorded |
| Birth            |               |
+------------------+---------------+
| Not on file      |               |
+------------------+---------------+
 
 
 
+----------------+-------------+-------------+
| Job Start Date | Occupation  | Industry    |
+----------------+-------------+-------------+
| Not on file    | Not on file | Not on file |
+----------------+-------------+-------------+
 
 
 
+----------------+--------------+------------+
| Travel History | Travel Start | Travel End |
+----------------+--------------+------------+
 
 
 
 
+-------------------------------------+
| No recent travel history available. |
+-------------------------------------+
 documented as of this encounter
 
 Plan of Treatment
 Not on filedocumented as of this encounter
 
 Visit Diagnoses
 Not on filedocumented in this encounter

## 2020-06-07 NOTE — XMS
Encounter Summary
  Created on: 2020
 
 Yony Twin Zane
 External Reference #: [id number]
 : 88
 Sex: Male
 
 Demographics
 
 
+-----------------------+--------------------+
| Address               | [address]          |
|                       | [place]  [address] |
+-----------------------+--------------------+
| Home Phone            | [phone]    |
+-----------------------+--------------------+
| Preferred Language    | Unknown            |
+-----------------------+--------------------+
| Marital Status        |             |
+-----------------------+--------------------+
| Adventist Affiliation | Unknown            |
+-----------------------+--------------------+
| Race                  | Unknown            |
+-----------------------+--------------------+
| Ethnic Group          | Unknown            |
+-----------------------+--------------------+
 
 
 Author
 
 
+--------------+--------------------------------------------+
| Author       | [facility]  |
|              | and [place]                                |
+--------------+--------------------------------------------+
| Organization | [facility]  |
|              | and [place]                                |
+--------------+--------------------------------------------+
| Address      | Unknown                                    |
+--------------+--------------------------------------------+
| Phone        | Unavailable                                |
+--------------+--------------------------------------------+
 
 
 
 Support
 
 
+------------------+--------------+--------------------+-----------------+
| Name             | Relationship | Address            | Phone           |
+------------------+--------------+--------------------+-----------------+
| Glenda Bhakta | ECON         | [address]          | [phone] |
|                  |              | [place]  [address] |                 |
+------------------+--------------+--------------------+-----------------+
 
 
 
 
 Care Team Providers
 
 
+-----------------------+------+-------------+
| Care Team Member Name | Role | Phone       |
+-----------------------+------+-------------+
 PCP  | Unavailable |
+-----------------------+------+-------------+
 
 
 
 Encounter Details
 
 
+--------+-----------+--------------------+--------------------+----------------------+
| Date   | Type      | Department         | Care Team          | Description          |
+--------+-----------+--------------------+--------------------+----------------------+
| / | Hospital  |   [facility]   |   Conversion       | Spells of decreased  |
| 2019   | Encounter | CENTER             | Transaction,       | attentiveness        |
|        |           | NEURODIAGNOSTICS   | Provider Unknown   |                      |
|        |           | [address] [facility]      |        |                      |
|        |           | [place], [place]       |  (Fax) |                      |
|        |           | [address]         |                    |                      |
|        |           | [phone]       |                    |                      |
+--------+-----------+--------------------+--------------------+----------------------+
 
 
 
 Social History
 
 
+----------------+-------+-----------+--------+------+
| Tobacco Use    | Types | Packs/Day | Years  | Date |
|                |       |           | Used   |      |
+----------------+-------+-----------+--------+------+
| Never Assessed |       |           |        |      |
+----------------+-------+-----------+--------+------+
 
 
 
+------------------+---------------+
| Sex Assigned at  | Date Recorded |
| Birth            |               |
+------------------+---------------+
| Not on file      |               |
+------------------+---------------+
 
 
 
+----------------+-------------+-------------+
| Job Start Date | Occupation  | Industry    |
+----------------+-------------+-------------+
| Not on file    | Not on file | Not on file |
+----------------+-------------+-------------+
 
 
 
+----------------+--------------+------------+
| Travel History | Travel Start | Travel End |
+----------------+--------------+------------+
 
 
 
 
+-------------------------------------+
| No recent travel history available. |
+-------------------------------------+
 documented as of this encounter
 
 Medications at Time of Discharge
 
 
+---------------------+---------------------+-----------+---------+----------+----------+
| Medication          | Sig                 | Dispensed | Refills | Start    | End Date |
|                     |                     |           |         | Date     |          |
+---------------------+---------------------+-----------+---------+----------+----------+
|   ibuprofen         | take 1 tablet by    |           | 0       | 20 |          |
| (ADVIL,MOTRIN) 600  | mouth with food or  |           |         | 19       |          |
| MG tablet           | milk three times a  |           |         |          |          |
|                     | day if needed       |           |         |          |          |
+---------------------+---------------------+-----------+---------+----------+----------+
 documented as of this encounter
 
 Plan of Treatment
 Not on filedocumented as of this encounter
 
 Visit Diagnoses
 
 
+-------------------------------------------------------------+
| Diagnosis                                                   |
+-------------------------------------------------------------+
|   Spells of decreased attentiveness  Other general symptoms |
+-------------------------------------------------------------+
 documented in this encounter

## 2020-06-07 NOTE — XMS
Encounter Summary
  Created on: 2020
 
 Yony Twin Zane
 External Reference #: [id number]
 : 88
 Sex: Male
 
 Demographics
 
 
+-----------------------+--------------------+
| Address               | [address]          |
|                       | [place]  [address] |
+-----------------------+--------------------+
| Home Phone            | [phone]    |
+-----------------------+--------------------+
| Preferred Language    | Unknown            |
+-----------------------+--------------------+
| Marital Status        |             |
+-----------------------+--------------------+
| Rastafarian Affiliation | Unknown            |
+-----------------------+--------------------+
| Race                  | Unknown            |
+-----------------------+--------------------+
| Ethnic Group          | Unknown            |
+-----------------------+--------------------+
 
 
 Author
 
 
+--------------+--------------------------------------------+
| Author       |  [facility] [place]  |
|              | and [place]                                |
+--------------+--------------------------------------------+
| Organization |  [facility] [place]  |
|              | and [place]                                |
+--------------+--------------------------------------------+
| Address      | Unknown                                    |
+--------------+--------------------------------------------+
| Phone        | Unavailable                                |
+--------------+--------------------------------------------+
 
 
 
 Support
 
 
+------------------+--------------+--------------------+-----------------+
| Name             | Relationship | Address            | Phone           |
+------------------+--------------+--------------------+-----------------+
| Glenda Bhakta | ECON         | [address]          | [phone] |
|                  |              | [place]  [address] |                 |
+------------------+--------------+--------------------+-----------------+
 
 
 
 
 Care Team Providers
 
 
+-----------------------+------+-------------+
| Care Team Member Name | Role | Phone       |
+-----------------------+------+-------------+
 PCP  | Unavailable |
+-----------------------+------+-------------+
 
 
 
 Encounter Details
 
 
+--------+-----------+--------------------+--------------------+----------------------+
| Date   | Type      | Department         | Care Team          | Description          |
+--------+-----------+--------------------+--------------------+----------------------+
| / | Hospital  |   [facility]   |   Conversion       | Seizure (HCC);       |
| 2019   | Encounter | CENTER             | Transaction,       | Syncope, unspecified |
|        |           | NEURODIAGNOSTICS   | Provider Unknown   |  syncope type        |
|        |           | [address] [facility]      |        |                      |
|        |           | [place], [place]       |  (Fax) |                      |
|        |           | [address]         |                    |                      |
|        |           | [phone]       |                    |                      |
+--------+-----------+--------------------+--------------------+----------------------+
 
 
 
 Social History
 
 
+----------------+-------+-----------+--------+------+
| Tobacco Use    | Types | Packs/Day | Years  | Date |
|                |       |           | Used   |      |
+----------------+-------+-----------+--------+------+
| Never Assessed |       |           |        |      |
+----------------+-------+-----------+--------+------+
 
 
 
+------------------+---------------+
| Sex Assigned at  | Date Recorded |
| Birth            |               |
+------------------+---------------+
| Not on file      |               |
+------------------+---------------+
 
 
 
+----------------+-------------+-------------+
| Job Start Date | Occupation  | Industry    |
+----------------+-------------+-------------+
| Not on file    | Not on file | Not on file |
+----------------+-------------+-------------+
 
 
 
+----------------+--------------+------------+
| Travel History | Travel Start | Travel End |
+----------------+--------------+------------+
 
 
 
 
+-------------------------------------+
| No recent travel history available. |
+-------------------------------------+
 documented as of this encounter
 
 Plan of Treatment
 Not on filedocumented as of this encounter
 
 Visit Diagnoses
 
 
+-------------------------------------+
| Diagnosis                           |
+-------------------------------------+
|   Seizure (HCC)  Other convulsions  |
+-------------------------------------+
|   Syncope, unspecified syncope type |
+-------------------------------------+
 documented in this encounter

## 2020-06-07 NOTE — XMS
Encounter Summary
  Created on: 2020
 
 Yony Twin Zane
 External Reference #: [id number]
 : 88
 Sex: Male
 
 Demographics
 
 
+-----------------------+--------------------+
| Address               | [address]          |
|                       | [place]  [address] |
+-----------------------+--------------------+
| Home Phone            | [phone]    |
+-----------------------+--------------------+
| Preferred Language    | Unknown            |
+-----------------------+--------------------+
| Marital Status        |             |
+-----------------------+--------------------+
| Muslim Affiliation | Unknown            |
+-----------------------+--------------------+
| Race                  | Unknown            |
+-----------------------+--------------------+
| Ethnic Group          | Unknown            |
+-----------------------+--------------------+
 
 
 Author
 
 
+--------------+--------------------------------------------+
| Author       | [facility] [place]  |
|              | and [place]                                |
+--------------+--------------------------------------------+
| Organization | [facility]  |
|              | and [place]                                |
+--------------+--------------------------------------------+
| Address      | Unknown                                    |
+--------------+--------------------------------------------+
| Phone        | Unavailable                                |
+--------------+--------------------------------------------+
 
 
 
 Support
 
 
+------------------+--------------+--------------------+-----------------+
| Name             | Relationship | Address            | Phone           |
+------------------+--------------+--------------------+-----------------+
| Glenda Bhakta | ECON         | [address]          | [phone] |
|                  |              | [place]  [address] |                 |
+------------------+--------------+--------------------+-----------------+
 
 
 
 
 Care Team Providers
 
 
+-----------------------+------+-----------------+
| Care Team Member Name | Role | Phone           |
+-----------------------+------+-----------------+
| Brittney Wall MD | PCP  | [phone] |
+-----------------------+------+-----------------+
 
 
 
 Encounter Details
 
 
+--------+-------------+---------------------+----------------------+----------------------+
| Date   | Type        | Department          | Care Team            | Description          |
+--------+-------------+---------------------+----------------------+----------------------+
| / | Orders Only |   [facility]     |   Ebony Mccloud,   | Other symptoms and   |
| 2019   |             | NEUROLOGY  [address]     | MD  [address]    | signs involving      |
|        |             | [address]   | [address]       | cognitive functions  |
|        |             | [place]        | [place] [address]  | and awareness        |
|        |             | [address]          |  [phone]        |                      |
|        |             | [phone]        | [phone] (Fax)   |                      |
+--------+-------------+---------------------+----------------------+----------------------+
 
 
 
 Social History
 
 
+---------------+-------+-----------+--------+------+
| Tobacco Use   | Types | Packs/Day | Years  | Date |
|               |       |           | Used   |      |
+---------------+-------+-----------+--------+------+
| Former Smoker |       |           |        |      |
+---------------+-------+-----------+--------+------+
 
 
 
+------------------+---------------+
| Sex Assigned at  | Date Recorded |
| Birth            |               |
+------------------+---------------+
| Not on file      |               |
+------------------+---------------+
 
 
 
+----------------+-------------+-------------+
| Job Start Date | Occupation  | Industry    |
+----------------+-------------+-------------+
| Not on file    | Not on file | Not on file |
+----------------+-------------+-------------+
 
 
 
+----------------+--------------+------------+
| Travel History | Travel Start | Travel End |
+----------------+--------------+------------+
 
 
 
 
+-------------------------------------+
| No recent travel history available. |
+-------------------------------------+
 documented as of this encounter
 
 Plan of Treatment
 
 
+-------------------+------+--------+----------------------+----------------------+
| Name              | Type | Priori | Associated Diagnoses | Order Schedule       |
|                   |      | ty     |                      |                      |
+-------------------+------+--------+----------------------+----------------------+
| TSH               | Lab  | Routin |   Other symptoms and | Expected:            |
|                   |      | e      |  signs involving     | 2019, Expires: |
|                   |      |        | cognitive functions  |  2020          |
|                   |      |        | and awareness        |                      |
+-------------------+------+--------+----------------------+----------------------+
| Vitamin B-12 and  | Lab  | Routin |   Other symptoms and | Expected:            |
| Folate            |      | e      |  signs involving     | 2019, Expires: |
|                   |      |        | cognitive functions  |  2020          |
|                   |      |        | and awareness        |                      |
+-------------------+------+--------+----------------------+----------------------+
 documented as of this encounter
 
 Visit Diagnoses
 
 
+------------------------------------------------------------------------+
| Diagnosis                                                              |
+------------------------------------------------------------------------+
|   Other symptoms and signs involving cognitive functions and awareness |
+------------------------------------------------------------------------+
 documented in this encounter

## 2020-06-07 NOTE — XMS
Encounter Summary
  Created on: 2020
 
 Yony Twin Zane
 External Reference #: [id number]
 : 88
 Sex: Male
 
 Demographics
 
 
+-----------------------+--------------------+
| Address               | [address]          |
|                       | [place]  [address] |
+-----------------------+--------------------+
| Home Phone            | [phone]    |
+-----------------------+--------------------+
| Preferred Language    | Unknown            |
+-----------------------+--------------------+
| Marital Status        |             |
+-----------------------+--------------------+
| Gnosticist Affiliation | Unknown            |
+-----------------------+--------------------+
| Race                  | Unknown            |
+-----------------------+--------------------+
| Ethnic Group          | Unknown            |
+-----------------------+--------------------+
 
 
 Author
 
 
+--------------+--------------------------------------------+
| Author       | [facility]  |
|              | and [place]                                |
+--------------+--------------------------------------------+
| Organization | [facility]  |
|              | and [place]                                |
+--------------+--------------------------------------------+
| Address      | Unknown                                    |
+--------------+--------------------------------------------+
| Phone        | Unavailable                                |
+--------------+--------------------------------------------+
 
 
 
 Support
 
 
+------------------+--------------+--------------------+-----------------+
| Name             | Relationship | Address            | Phone           |
+------------------+--------------+--------------------+-----------------+
| Glenda Bhakta | ECON         | [address]          | [phone] |
|                  |              | [place]  [address] |                 |
+------------------+--------------+--------------------+-----------------+
 
 
 
 
 Care Team Providers
 
 
+-----------------------+------+-------------+
| Care Team Member Name | Role | Phone       |
+-----------------------+------+-------------+
 PCP  | Unavailable |
+-----------------------+------+-------------+
 
 
 
 Encounter Details
 
 
+--------+-----------+--------------------+--------------------+----------------------+
| Date   | Type      | Department         | Care Team          | Description          |
+--------+-----------+--------------------+--------------------+----------------------+
| / | Hospital  |   [facility] MED[facility]   |   Conversion       | Spells of decreased  |
| 2019   | Encounter | CENTER             | Transaction,       | attentiveness        |
|        |           | NEURODIAGNOSTICS   | Provider Unknown   |                      |
|        |           | [address] [facility]      |        |                      |
|        |           | [place], [place]       |  (Fax) |                      |
|        |           | [address]         |                    |                      |
|        |           | [phone]       |                    |                      |
+--------+-----------+--------------------+--------------------+----------------------+
 
 
 
 Social History
 
 
+----------------+-------+-----------+--------+------+
| Tobacco Use    | Types | Packs/Day | Years  | Date |
|                |       |           | Used   |      |
+----------------+-------+-----------+--------+------+
| Never Assessed |       |           |        |      |
+----------------+-------+-----------+--------+------+
 
 
 
+------------------+---------------+
| Sex Assigned at  | Date Recorded |
| Birth            |               |
+------------------+---------------+
| Not on file      |               |
+------------------+---------------+
 
 
 
+----------------+-------------+-------------+
| Job Start Date | Occupation  | Industry    |
+----------------+-------------+-------------+
| Not on file    | Not on file | Not on file |
+----------------+-------------+-------------+
 
 
 
+----------------+--------------+------------+
| Travel History | Travel Start | Travel End |
+----------------+--------------+------------+
 
 
 
 
+-------------------------------------+
| No recent travel history available. |
+-------------------------------------+
 documented as of this encounter
 
 Medications at Time of Discharge
 
 
+---------------------+---------------------+-----------+---------+----------+----------+
| Medication          | Sig                 | Dispensed | Refills | Start    | End Date |
|                     |                     |           |         | Date     |          |
+---------------------+---------------------+-----------+---------+----------+----------+
|   ibuprofen         | take 1 tablet by    |           | 0       | 20 |          |
| (ADVIL,MOTRIN) 600  | mouth with food or  |           |         | 19       |          |
| MG tablet           | milk three times a  |           |         |          |          |
|                     | day if needed       |           |         |          |          |
+---------------------+---------------------+-----------+---------+----------+----------+
 documented as of this encounter
 
 Plan of Treatment
 Not on filedocumented as of this encounter
 
 Visit Diagnoses
 
 
+-------------------------------------------------------------+
| Diagnosis                                                   |
+-------------------------------------------------------------+
|   Spells of decreased attentiveness  Other general symptoms |
+-------------------------------------------------------------+
 documented in this encounter

## 2020-06-07 NOTE — XMS
Encounter Summary
  Created on: 2020
 
 Yony Twin Zane
 External Reference #: [id number]
 : 88
 Sex: Male
 
 Demographics
 
 
+-----------------------+--------------------+
| Address               | [address]          |
|                       | [place]  [address] |
+-----------------------+--------------------+
| Home Phone            | [phone]    |
+-----------------------+--------------------+
| Preferred Language    | Unknown            |
+-----------------------+--------------------+
| Marital Status        |             |
+-----------------------+--------------------+
| Voodoo Affiliation | Unknown            |
+-----------------------+--------------------+
| Race                  | Unknown            |
+-----------------------+--------------------+
| Ethnic Group          | Unknown            |
+-----------------------+--------------------+
 
 
 Author
 
 
+--------------+--------------------------------------------+
| Author       | [facility]  |
|              | and [place]                                |
+--------------+--------------------------------------------+
| Organization | [facility]  |
|              | and [place]                                |
+--------------+--------------------------------------------+
| Address      | Unknown                                    |
+--------------+--------------------------------------------+
| Phone        | Unavailable                                |
+--------------+--------------------------------------------+
 
 
 
 Support
 
 
+------------------+--------------+--------------------+-----------------+
| Name             | Relationship | Address            | Phone           |
+------------------+--------------+--------------------+-----------------+
| Glenda Bhakta | ECON         | [address]          | [phone] |
|                  |              | [place]  [id number] |                 |
+------------------+--------------+--------------------+-----------------+
 
 
 
 
 Care Team Providers
 
 
+-----------------------+------+-----------------+
| Care Team Member Name | Role | Phone           |
+-----------------------+------+-----------------+
| Brittney Wall MD | PCP  | [phone] |
+-----------------------+------+-----------------+
 
 
 
 Encounter Details
 
 
+--------+-------------+----------------------+--------------------+-------------+
| Date   | Type        | Department           | Care Team          | Description |
+--------+-------------+----------------------+--------------------+-------------+
| / | Orders Only |   [facility]     |   Conversion       |             |
| 2019   |             | CONVERSION DEP  888  | Transaction,       |             |
|        |             | [facility]           | Provider Unknown   |             |
|        |             | [facility]         | [phone]       |             |
|        |             | [address]           | [phone] (Fax) |             |
|        |             | [phone]         |                    |             |
+--------+-------------+----------------------+--------------------+-------------+
 
 
 
 Social History
 
 
+----------------+-------+-----------+--------+------+
| Tobacco Use    | Types | Packs/Day | Years  | Date |
|                |       |           | Used   |      |
+----------------+-------+-----------+--------+------+
| Never Assessed |       |           |        |      |
+----------------+-------+-----------+--------+------+
 
 
 
+------------------+---------------+
| Sex Assigned at  | Date Recorded |
| Birth            |               |
+------------------+---------------+
| Not on file      |               |
+------------------+---------------+
 
 
 
+----------------+-------------+-------------+
| Job Start Date | Occupation  | Industry    |
+----------------+-------------+-------------+
| Not on file    | Not on file | Not on file |
+----------------+-------------+-------------+
 
 
 
+----------------+--------------+------------+
| Travel History | Travel Start | Travel End |
+----------------+--------------+------------+
 
 
 
 
+-------------------------------------+
| No recent travel history available. |
+-------------------------------------+
 documented as of this encounter
 
 Plan of Treatment
 Not on filedocumented as of this encounter
 
 Visit Diagnoses
 Not on filedocumented in this encounter

## 2020-06-07 NOTE — XMS
Encounter Summary
  Created on: 2020
 
 Yony Twin Zane
 External Reference #: [id number]
 : 88
 Sex: Male
 
 Demographics
 
 
+-----------------------+--------------------+
| Address               | [address]          |
|                       | [place]  [address] |
+-----------------------+--------------------+
| Home Phone            | [phone]    |
+-----------------------+--------------------+
| Preferred Language    | Unknown            |
+-----------------------+--------------------+
| Marital Status        |             |
+-----------------------+--------------------+
| Yarsanism Affiliation | Unknown            |
+-----------------------+--------------------+
| Race                  | Unknown            |
+-----------------------+--------------------+
| Ethnic Group          | Unknown            |
+-----------------------+--------------------+
 
 
 Author
 
 
+--------------+--------------------------------------------+
| Author       | [facility]  |
|              | and [place]                                |
+--------------+--------------------------------------------+
| Organization | [facility]  |
|              | and [place]                                |
+--------------+--------------------------------------------+
| Address      | Unknown                                    |
+--------------+--------------------------------------------+
| Phone        | Unavailable                                |
+--------------+--------------------------------------------+
 
 
 
 Support
 
 
+------------------+--------------+--------------------+-----------------+
| Name             | Relationship | Address            | Phone           |
+------------------+--------------+--------------------+-----------------+
| Glenda Bhakta | ECON         | [address]          | [phone] |
|                  |              | [place]  [address] |                 |
+------------------+--------------+--------------------+-----------------+
 
 
 
 
 Care Team Providers
 
 
+-----------------------+------+-------------+
| Care Team Member Name | Role | Phone       |
+-----------------------+------+-------------+
 PCP  | Unavailable |
+-----------------------+------+-------------+
 
 
 
 Encounter Details
 
 
+--------+-----------+---------------------+----------------------+-------------+
| Date   | Type      | Department          | Care Team            | Description |
+--------+-----------+---------------------+----------------------+-------------+
| / | Hospital  |   [facility]      |   [facility]  |             |
|    | Encounter | [facility]  | [address]      |             |
|        |           | CENTER  [address]  | [address]        |             |
|        |           | [address] [place]   | [place] [address]     |             |
|        |           | [id number]          | [phone]         |             |
|        |           | [phone]        | [phone] (Fax)   |             |
+--------+-----------+---------------------+----------------------+-------------+
 
 
 
 Social History
 
 
+----------------+-------+-----------+--------+------+
| Tobacco Use    | Types | Packs/Day | Years  | Date |
|                |       |           | Used   |      |
+----------------+-------+-----------+--------+------+
| Never Assessed |       |           |        |      |
+----------------+-------+-----------+--------+------+
 
 
 
+------------------+---------------+
| Sex Assigned at  | Date Recorded |
| Birth            |               |
+------------------+---------------+
| Not on file      |               |
+------------------+---------------+
 
 
 
+----------------+-------------+-------------+
| Job Start Date | Occupation  | Industry    |
+----------------+-------------+-------------+
| Not on file    | Not on file | Not on file |
+----------------+-------------+-------------+
 
 
 
+----------------+--------------+------------+
| Travel History | Travel Start | Travel End |
+----------------+--------------+------------+
 
 
 
 
+-------------------------------------+
| No recent travel history available. |
+-------------------------------------+
 documented as of this encounter
 
 Plan of Treatment
 Not on filedocumented as of this encounter
 
 Procedures
 
 
+----------------------+--------+-------------+----------------------+----------------------
+
| Procedure Name       | Priori | Date/Time   | Associated Diagnosis | Comments             
|
|                      | ty     |             |                      |                      
|
+----------------------+--------+-------------+----------------------+----------------------
+
| URINALYSIS WITH      | STAT   | 2015  |                      |   Results for this   
|
| MICROSCOPIC WITH     |        |  5:35 AM    |                      | procedure are in the 
|
| CULTURE IF INDICATED |        | PST         |                      |  results section.    
|
+----------------------+--------+-------------+----------------------+----------------------
+
| CBC W/AUTO           | STAT   | 2015  |                      |   Results for this   
|
| DIFFERENTIAL         |        |  5:35 AM    |                      | procedure are in the 
|
|                      |        | PST         |                      |  results section.    
|
+----------------------+--------+-------------+----------------------+----------------------
+
| COMPREHENSIVE        | STAT   | 2015  |                      |   Results for this   
|
| METABOLIC PANEL      |        |  5:35 AM    |                      | procedure are in the 
|
|                      |        | PST         |                      |  results section.    
|
+----------------------+--------+-------------+----------------------+----------------------
+
| CT ABDOMEN PELVIS WO | Routin | 2015  |                      |   Results for this   
|
|  CONTRAST            | e      |  5:18 AM    |                      | procedure are in the 
|
|                      |        | PST         |                      |  results section.    
|
+----------------------+--------+-------------+----------------------+----------------------
+
 documented in this encounter
 
 Results
 Urinalysis with Microscopic with Culture if Indicated (2015  5:35 AM PST)
 
+-------------+--------------------+----------------+------------+--------------+
| Component   | Value              | Ref Range      | Performed  | Pathologist  |
 
|             |                    |                | At         | Signature    |
+-------------+--------------------+----------------+------------+--------------+
| Source      | Clean Catch / VOID |                | EXTERNAL   |              |
|             |                    |                | LAB        |              |
+-------------+--------------------+----------------+------------+--------------+
| Clarity     | HAZY               | CLEAR          | EXTERNAL   |              |
|             |                    |                | LAB        |              |
+-------------+--------------------+----------------+------------+--------------+
| Color,      | DRK YELLOW         | YELLOW         | EXTERNAL   |              |
| Urine       |                    |                | LAB        |              |
+-------------+--------------------+----------------+------------+--------------+
| Specific    | 1.02               | 1.005 - 1.030  | EXTERNAL   |              |
| Gravity,    |                    |                | LAB        |              |
| Urine       |                    |                |            |              |
+-------------+--------------------+----------------+------------+--------------+
| pH, Urine   | 6                  | 5.0 - 7.0 pH   | EXTERNAL   |              |
|             |                    |                | LAB        |              |
+-------------+--------------------+----------------+------------+--------------+
| Leukocyte   | NEGATIVE           | NEGATIVE /uL   | EXTERNAL   |              |
| Esterase,   |                    |                | LAB        |              |
| Urine       |                    |                |            |              |
+-------------+--------------------+----------------+------------+--------------+
| Nitrite,    | NEGATIVE           | NEGATIVE       | EXTERNAL   |              |
| Urine       |                    |                | LAB        |              |
+-------------+--------------------+----------------+------------+--------------+
| Protein,    | NEGATIVE           | NEGATIVE mg/dL | EXTERNAL   |              |
| Urine       |                    |                | LAB        |              |
+-------------+--------------------+----------------+------------+--------------+
| Glucose,    | NORMAL             | NORMAL mg/dL   | EXTERNAL   |              |
| Urine       |                    |                | LAB        |              |
+-------------+--------------------+----------------+------------+--------------+
| Reducing    | NOT REQUIRED       | NEGATIVE       | EXTERNAL   |              |
| Substance,  |                    |                | LAB        |              |
| UA, POC     |                    |                |            |              |
+-------------+--------------------+----------------+------------+--------------+
| Ketones,    | NEGATIVE           | NEGATIVE mg/dL | EXTERNAL   |              |
| Urine       |                    |                | LAB        |              |
+-------------+--------------------+----------------+------------+--------------+
| Urobilinoge | NORMAL             | NORMAL mg/dL   | EXTERNAL   |              |
| n, Urine    |                    |                | LAB        |              |
+-------------+--------------------+----------------+------------+--------------+
| Bilirubin,  | NEGATIVE           | NEGATIVE mg/dL | EXTERNAL   |              |
| Urine       |                    |                | LAB        |              |
+-------------+--------------------+----------------+------------+--------------+
| Blood,      | 250                | NEGATIVE /uL   | EXTERNAL   |              |
| Urine       |                    |                | LAB        |              |
+-------------+--------------------+----------------+------------+--------------+
| White Blood | 0-2                | </= 5 /HPF     | EXTERNAL   |              |
|  Cells,     |                    |                | LAB        |              |
| Urine       |                    |                |            |              |
+-------------+--------------------+----------------+------------+--------------+
| RBC COUNT   | 20-50              | </= 5 PER HPF  | EXTERNAL   |              |
|             |                    |                | LAB        |              |
+-------------+--------------------+----------------+------------+--------------+
| Bacteria,   | NONE SEEN          | NONE SEEN /HPF | EXTERNAL   |              |
| UA          |                    |                | LAB        |              |
+-------------+--------------------+----------------+------------+--------------+
| Culture     | NO                 |                | EXTERNAL   |              |
| Indicated   |                    |                | LAB        |              |
+-------------+--------------------+----------------+------------+--------------+
 
| Squamous    | RARE               | /LPF           | EXTERNAL   |              |
| Epithelial  |                    |                | LAB        |              |
| Cells,      |                    |                |            |              |
| Urine       |                    |                |            |              |
+-------------+--------------------+----------------+------------+--------------+
 
 
 
+----------+
| Specimen |
+----------+
|          |
+----------+
 
 
 
+----------------+---------+--------------------+--------------+
| Performing     | Address | City/State/Zipcode | Phone Number |
| Organization   |         |                    |              |
+----------------+---------+--------------------+--------------+
|   EXTERNAL LAB |         |                    |              |
+----------------+---------+--------------------+--------------+
 Comprehensive Metabolic Panel (2015  5:35 AM PST)
 
+-------------+-------+-----------------+------------+--------------+
| Component   | Value | Ref Range       | Performed  | Pathologist  |
|             |       |                 | At         | Signature    |
+-------------+-------+-----------------+------------+--------------+
| Sodium      | 136   | 132 - 143       | EXTERNAL   |              |
|             |       | mmol/L          | LAB        |              |
+-------------+-------+-----------------+------------+--------------+
| Potassium   | 3.8   | 3.3 - 4.9       | EXTERNAL   |              |
|             |       | mmol/L          | LAB        |              |
+-------------+-------+-----------------+------------+--------------+
| Cl          | 106   | 95 - 108 mmol/L | EXTERNAL   |              |
|             |       |                 | LAB        |              |
+-------------+-------+-----------------+------------+--------------+
| CO2         | 26    | 23 - 34 mmol/L  | EXTERNAL   |              |
|             |       |                 | LAB        |              |
+-------------+-------+-----------------+------------+--------------+
| Anion Gap   | 4     | 7 - 16          | EXTERNAL   |              |
|             |       |                 | LAB        |              |
+-------------+-------+-----------------+------------+--------------+
| Calcium     | 8.6   | 8.3 - 10.0      | EXTERNAL   |              |
|             |       | mg/dL           | LAB        |              |
+-------------+-------+-----------------+------------+--------------+
| Glucose     | 97    | 70 - 110 mg/dL  | EXTERNAL   |              |
|             |       |                 | LAB        |              |
+-------------+-------+-----------------+------------+--------------+
| BUN, Bld    | 15    | 5 - 26 mg/dL    | EXTERNAL   |              |
|             |       |                 | LAB        |              |
+-------------+-------+-----------------+------------+--------------+
| Creatinine  | 0.8   | 0.7 - 1.4 mg/dL | EXTERNAL   |              |
|             |       |                 | LAB        |              |
+-------------+-------+-----------------+------------+--------------+
| BUN/Creatin | 18.8  | 7.0 - 24.0      | EXTERNAL   |              |
| ine Ratio   |       | RATIO           | LAB        |              |
+-------------+-------+-----------------+------------+--------------+
| GFR         | 60    | >=60            | EXTERNAL   |              |
| ESTIMATE    |       | mL/min/1.73m2   | LAB        |              |
 
| (REF)       |       |                 |            |              |
+-------------+-------+-----------------+------------+--------------+
| Bilirubin,  | 0.5   | <=1.2 mg/dL     | EXTERNAL   |              |
| Total       |       |                 | LAB        |              |
+-------------+-------+-----------------+------------+--------------+
| Protein,    | 7.5   | 6.6 - 8.5 g/dL  | EXTERNAL   |              |
| Total       |       |                 | LAB        |              |
+-------------+-------+-----------------+------------+--------------+
| Albumin     | 4.3   | 3.0 - 4.5 g/dL  | EXTERNAL   |              |
|             |       |                 | LAB        |              |
+-------------+-------+-----------------+------------+--------------+
| Alkaline    | 69    | 46 - 116 U/L    | EXTERNAL   |              |
| Phosphatase |       |                 | LAB        |              |
+-------------+-------+-----------------+------------+--------------+
| ALT,        | 30    | 16 - 63 U/L     | EXTERNAL   |              |
| External    |       |                 | LAB        |              |
+-------------+-------+-----------------+------------+--------------+
| AST,        | 28    | <=38 U/L        | EXTERNAL   |              |
| External    |       |                 | LAB        |              |
+-------------+-------+-----------------+------------+--------------+
 
 
 
+----------+
| Specimen |
+----------+
|          |
+----------+
 
 
 
+----------------+---------+--------------------+--------------+
| Performing     | Address | City/State/Zipcode | Phone Number |
| Organization   |         |                    |              |
+----------------+---------+--------------------+--------------+
|   EXTERNAL LAB |         |                    |              |
+----------------+---------+--------------------+--------------+
 CBC w/ Auto Differential (2015  5:35 AM PST)
 
+-------------+-------+----------------+------------+--------------+
| Component   | Value | Ref Range      | Performed  | Pathologist  |
|             |       |                | At         | Signature    |
+-------------+-------+----------------+------------+--------------+
| WBC         | 5     | 4.6 - 10.5     | EXTERNAL   |              |
|             |       | 1000/mm3       | LAB        |              |
+-------------+-------+----------------+------------+--------------+
| RBC         | 5.27  | 4.36 - 5.83    | EXTERNAL   |              |
|             |       | mil/mm3        | LAB        |              |
+-------------+-------+----------------+------------+--------------+
| HGB,        | 16.8  | 13.1 - 17.4    | EXTERNAL   |              |
| External    |       | g/dL           | LAB        |              |
+-------------+-------+----------------+------------+--------------+
| HCT,        | 47.2  | 39.0 - 51.9 %  | EXTERNAL   |              |
| External    |       |                | LAB        |              |
+-------------+-------+----------------+------------+--------------+
| MCV         | 90    | 82 - 96 fl     | EXTERNAL   |              |
|             |       |                | LAB        |              |
+-------------+-------+----------------+------------+--------------+
| MCH         | 31.9  | 27.7 - 32.3 pg | EXTERNAL   |              |
|             |       |                | LAB        |              |
 
+-------------+-------+----------------+------------+--------------+
| MCHC        | 35.6  | 32.0 - 36.9    | EXTERNAL   |              |
|             |       | g/dL           | LAB        |              |
+-------------+-------+----------------+------------+--------------+
| RDW-CV      | 12.9  | <=17.0 %       | EXTERNAL   |              |
|             |       |                | LAB        |              |
+-------------+-------+----------------+------------+--------------+
| RDW-SD      | 41.9  | 34.0 - 57.0 fL | EXTERNAL   |              |
|             |       |                | LAB        |              |
+-------------+-------+----------------+------------+--------------+
| Platelet    | 203   | 150 - 450      | EXTERNAL   |              |
| Count       |       | 1000/mm3       | LAB        |              |
| Plasma      |       |                |            |              |
+-------------+-------+----------------+------------+--------------+
| MPV         | 10.3  | 9.4 - 12.4 FL  | EXTERNAL   |              |
|             |       |                | LAB        |              |
+-------------+-------+----------------+------------+--------------+
| % Segmented | 63.4  | 42.0 - 76.0 %  | EXTERNAL   |              |
|             |       |                | LAB        |              |
| Neutrophils |       |                |            |              |
+-------------+-------+----------------+------------+--------------+
| %           | 24.9  | 20.0 - 40.0 %  | EXTERNAL   |              |
| Lymphocytes |       |                | LAB        |              |
+-------------+-------+----------------+------------+--------------+
| % Monocytes | 9.5   | 3.0 - 13.0 %   | EXTERNAL   |              |
|             |       |                | LAB        |              |
+-------------+-------+----------------+------------+--------------+
| %           | 1.8   | 0.0 - 7.0 %    | EXTERNAL   |              |
| Eosinophils |       |                | LAB        |              |
+-------------+-------+----------------+------------+--------------+
| % Basophils | 0.4   | 0.0 - 2.0 %    | EXTERNAL   |              |
|             |       |                | LAB        |              |
+-------------+-------+----------------+------------+--------------+
| Absolute    | 3.15  | 2.80 - 7.70    | EXTERNAL   |              |
| Neutrophils |       | 1000/mm3       | LAB        |              |
+-------------+-------+----------------+------------+--------------+
| Absolute    | 1.24  | 1.20 - 3.30    | EXTERNAL   |              |
| Lymphocytes |       | 1000/mm3       | LAB        |              |
+-------------+-------+----------------+------------+--------------+
| Absolute    | 0.47  | 0.00 - 0.80    | EXTERNAL   |              |
| Monocytes   |       | 1000/mm3       | LAB        |              |
+-------------+-------+----------------+------------+--------------+
| Absolute    | 0.09  | 0.00 - 0.70    | EXTERNAL   |              |
| Eosinophils |       | 1000/mm3       | LAB        |              |
+-------------+-------+----------------+------------+--------------+
| Absolute    | 0.02  | 0.00 - 0.20    | EXTERNAL   |              |
| Basophils   |       | 1000/mm3       | LAB        |              |
+-------------+-------+----------------+------------+--------------+
| SLIDE       | NO    |                | EXTERNAL   |              |
| REVIEWED    |       |                | LAB        |              |
+-------------+-------+----------------+------------+--------------+
 
 
 
+----------+
| Specimen |
+----------+
|          |
+----------+
 
 
 
 
+----------------+---------+--------------------+--------------+
| Performing     | Address | City/State/Zipcode | Phone Number |
| Organization   |         |                    |              |
+----------------+---------+--------------------+--------------+
|   EXTERNAL LAB |         |                    |              |
+----------------+---------+--------------------+--------------+
 CT Abdomen Pelvis wo Contrast (2015  5:18 AM PST)
 
+----------+
| Specimen |
+----------+
|          |
+----------+
 
 
 
+------------------------------------------------------------------------+--------------+
| Narrative                                                              | Performed At |
+------------------------------------------------------------------------+--------------+
|      **ORIGINAL**     CT OF THE ABDOMEN AND PELVIS WITHOUT CONTRAST:   |              |
|    HISTORY:  Abdominal pain.     TECHNIQUE:  5-mm axial slices were    |              |
| acquired through the abdomen and pelvis without contrast.              |              |
| COMPARISON:  2009.     FINDINGS:  Heart size is normal.    |              |
| Lung bases are clear. No pericardial or pleural effusion.              |              |
| Evaluation of abdominal viscera is limited due to the lack of          |              |
| intravenous contrast.   3-mm hypodensity near the liver dome is noted  |              |
| on today's study, image #27 of series #3. This likely represents a     |              |
| small cyst.   A 14-mm subcapsular hypodensity in the   posterior right |              |
|  liver lobe on image #49.   Retrospectively this is unchanged compared |              |
|  to the prior study and the stability is consistent with a benign      |              |
| process.   The gallbladder, pancreas and spleen are unremarkable.      |              |
|   Adrenal glands are symmetric.     The inferior pole of the right     |              |
| kidney contains a 3.5 mm stone.   This stone previously measured 1 mm. |              |
|  The other right-sided renal stones seen on the prior study are no     |              |
| longer within the kidney.   No right ureterolith is identified.   The  |              |
| left kidney has a 1   mm stone centrally and at least two 1 mm stones  |              |
| in the inferior pole.   There are several additional faint             |              |
| hyperdensities which are nonspecific and may represent early stone     |              |
| formation.   No hydronephrosis, no hydroureter.   The bladder is       |              |
| decompressed.   There is a 3-mm calcification within the distal penis  |              |
| in the expected region of the urethra.   A stone within the urethra    |              |
| should be considered.   Prostate gland and seminal vesicles are age    |              |
| appropriate.     The bowel gas pattern is nonobstructive.     Aorta is |              |
|  normal in course and caliber. No pathologically enlarged lymph nodes. |              |
|    No acute bone process.     IMPRESSION:  1. Bilateral                |              |
| nephrolithiasis without evidence of obstruction.  2. Benign appearing  |              |
| liver hypodensities.        D: 2015  Job#: [id number]     Read By: |              |
|  JOE LIMA MD     Released By: JOE LIMA MD  Date:   |              |
|   2015 09:49                                                     |              |
+------------------------------------------------------------------------+--------------+
 
 
 
+-------------------------------------------------------------------------------------------
--------------------------------------------------------------------------------------------
------------------+
| Procedure Note                                                                            
                                                                                            
 
                  |
+-------------------------------------------------------------------------------------------
--------------------------------------------------------------------------------------------
------------------+
|   Alfonso, Rad Results In - 2017  8:49 PM PST   **ORIGINAL** CT OF THE ABDOMEN AND      
                                                                                            
                  |
| PELVIS WITHOUT CONTRAST: HISTORY:Abdominal pain. TECHNIQUE:5-mm axial slices were         
                                                                                            
                  |
| acquired through the abdomen and pelvis without contrast. COMPARISON:2009.    
                                                                                            
                  |
| FINDINGS:Heart size is normal. Lung bases are clear. No pericardial or pleural effusion.  
                                                                                            
                  |
|  Evaluation of abdominal viscera is limited due to the lack of intravenous contrast.      
                                                                                            
                  |
| 3-mm hypodensity near the liver dome is noted on today's study, image #27 of series #3.   
                                                                                            
                  |
| This likely represents a small cyst.  A 14-mm subcapsular hypodensity in the posterior    
                                                                                            
                  |
| right liver lobe on image #49.  Retrospectively this is unchanged compared to the prior   
                                                                                            
                  |
| study and the stability is consistent with a benign process.  The gallbladder, pancreas   
                                                                                            
                  |
| and spleen are unremarkable.  Adrenal glands are symmetric. The inferior pole of the      
                                                                                            
                  |
| right kidney contains a 3.5 mm stone.  This stone previously measured 1 mm. The other     
                                                                                            
                  |
| right-sided renal stones seen on the prior study are no longer within the kidney.  No     
                                                                                            
                  |
| right ureterolith is identified.  The left kidney has a 1 mm stone centrally and at       
                                                                                            
                  |
| least two 1 mm stones in the inferior pole.  There are several additional faint           
                                                                                            
                  |
| hyperdensities which are nonspecific and may represent early stone formation.  No         
                                                                                            
                  |
| hydronephrosis, no hydroureter.  The bladder is decompressed. There is a 3-mm             
                                                                                            
                  |
| calcification within the distal penis in the expected region of the urethra.  A stone     
                                                                                            
                  |
| within the urethra should be considered.  Prostate gland and seminal vesicles are age     
                                                                                            
                  |
| appropriate. The bowel gas pattern is nonobstructive. Aorta is normal in course and       
                                                                                            
 
                  |
| caliber. No pathologically enlarged lymph nodes.  No acute bone process. IMPRESSION:1.    
                                                                                            
                  |
| Bilateral nephrolithiasis without evidence of obstruction.2. Benign appearing liver       
                                                                                            
                  |
| hypodensities.  D: 2015Job#: [id number] Read By: JOE LIMA MD Released By:   
                                                                                            
                  |
| JOE LIMA MDDate:  2015 09:49                                              
                                                                                            
                  |
|There is a 3-mm calcification within the distal penis in the expected region of the urethra
.  A stone within the urethra should be considered.  Prostate gland and seminal vesicles are
 age appropriate. |
|                                                                                           
                                                                                            
                  |
|The bowel gas pattern is nonobstructive.                                                   
                                                                                            
                  |
|                                                                                           
                                                                                            
                  |
|Aorta is normal in course and caliber. No pathologically enlarged lymph nodes.  No acute beverly
ne process.                                                                                 
                  |
|                                                                                           
                                                                                            
                  |
|IMPRESSION:                                                                                
                                                                                            
                 |
|1. Bilateral nephrolithiasis without evidence of obstruction.                              
                                                                                            
                  |
|2. Benign appearing liver hypodensities.                                                   
                                                                                            
                  |
|                                                                                           
                                                                                            
                  |
|                                                                                           
                                                                                            
                  |
|D: 2015                                                                              
                                                                                            
                  |
|Job#: [id number]                                                                             
                                                                                            
                  |
|                                                                                           
                                                                                            
                  |
|Read By: JOE LIMA MD                                                             
                                                                                            
                  |
|                                                                                           
                                                                                            
 
                  |
|Released By: JOE LIMA MD                                                         
                                                                                            
                  |
|Date:  2015 09:49                                                                    
                                                                                            
                  |
|                                                                                           
                                                                                            
                  |
|                                                                                           
                                                                                            
                  |
+-------------------------------------------------------------------------------------------
--------------------------------------------------------------------------------------------
------------------+
 documented in this encounter
 
 Visit Diagnoses
 Not on filedocumented in this encounter

## 2020-06-07 NOTE — XMS
Encounter Summary
  Created on: 2020
 
 Yony Twin Zane
 External Reference #: [id number]
 : 88
 Sex: Male
 
 Demographics
 
 
+-----------------------+--------------------+
| Address               | [address]          |
|                       | [place]  [address] |
+-----------------------+--------------------+
| Home Phone            | [phone]    |
+-----------------------+--------------------+
| Preferred Language    | Unknown            |
+-----------------------+--------------------+
| Marital Status        |             |
+-----------------------+--------------------+
| Cheondoism Affiliation | Unknown            |
+-----------------------+--------------------+
| Race                  | Unknown            |
+-----------------------+--------------------+
| Ethnic Group          | Unknown            |
+-----------------------+--------------------+
 
 
 Author
 
 
+--------------+--------------------------------------------+
| Author       | [facility]  |
|              | and [place]                                |
+--------------+--------------------------------------------+
| Organization | [facility]  |
|              | and [place]                                |
+--------------+--------------------------------------------+
| Address      | Unknown                                    |
+--------------+--------------------------------------------+
| Phone        | Unavailable                                |
+--------------+--------------------------------------------+
 
 
 
 Support
 
 
+------------------+--------------+--------------------+-----------------+
| Name             | Relationship | Address            | Phone           |
+------------------+--------------+--------------------+-----------------+
| Glenda Bhakta | ECON         | [address]          | [phone] |
|                  |              | [place]  [address] |                 |
+------------------+--------------+--------------------+-----------------+
 
 
 
 
 Care Team Providers
 
 
+-----------------------+------+-------------+
| Care Team Member Name | Role | Phone       |
+-----------------------+------+-------------+
 PCP  | Unavailable |
+-----------------------+------+-------------+
 
 
 
 Encounter Details
 
 
+--------+-----------+---------------------+----------------------+-------------+
| Date   | Type      | Department          | Care Team            | Description |
+--------+-----------+---------------------+----------------------+-------------+
| / | Hospital  |   [facility]      |   [facility]  |             |
|    | Encounter | [facility]  | [address]      |             |
|        |           | CENTER  [address]  | [address]        |             |
|        |           | [address] [place]   | [place] [address]     |             |
|        |           | [phone]          | [phone]         |             |
|        |           | [phone]        | [phone] (Fax)   |             |
+--------+-----------+---------------------+----------------------+-------------+
 
 
 
 Social History
 
 
+----------------+-------+-----------+--------+------+
| Tobacco Use    | Types | Packs/Day | Years  | Date |
|                |       |           | Used   |      |
+----------------+-------+-----------+--------+------+
| Never Assessed |       |           |        |      |
+----------------+-------+-----------+--------+------+
 
 
 
+------------------+---------------+
| Sex Assigned at  | Date Recorded |
| Birth            |               |
+------------------+---------------+
| Not on file      |               |
+------------------+---------------+
 
 
 
+----------------+-------------+-------------+
| Job Start Date | Occupation  | Industry    |
+----------------+-------------+-------------+
| Not on file    | Not on file | Not on file |
+----------------+-------------+-------------+
 
 
 
+----------------+--------------+------------+
| Travel History | Travel Start | Travel End |
+----------------+--------------+------------+
 
 
 
 
+-------------------------------------+
| No recent travel history available. |
+-------------------------------------+
 documented as of this encounter
 
 Plan of Treatment
 Not on filedocumented as of this encounter
 
 Procedures
 
 
+----------------------+--------+-------------+----------------------+----------------------
+
| Procedure Name       | Priori | Date/Time   | Associated Diagnosis | Comments             
|
|                      | ty     |             |                      |                      
|
+----------------------+--------+-------------+----------------------+----------------------
+
| URINALYSIS WITH      | STAT   | 2015  |                      |   Results for this   
|
| MICROSCOPIC WITH     |        |  5:35 AM    |                      | procedure are in the 
|
| CULTURE IF INDICATED |        | PST         |                      |  results section.    
|
+----------------------+--------+-------------+----------------------+----------------------
+
| CBC W/AUTO           | STAT   | 2015  |                      |   Results for this   
|
| DIFFERENTIAL         |        |  5:35 AM    |                      | procedure are in the 
|
|                      |        | PST         |                      |  results section.    
|
+----------------------+--------+-------------+----------------------+----------------------
+
| COMPREHENSIVE        | STAT   | 2015  |                      |   Results for this   
|
| METABOLIC PANEL      |        |  5:35 AM    |                      | procedure are in the 
|
|                      |        | PST         |                      |  results section.    
|
+----------------------+--------+-------------+----------------------+----------------------
+
| CT ABDOMEN PELVIS WO | Routin | 2015  |                      |   Results for this   
|
|  CONTRAST            | e      |  5:18 AM    |                      | procedure are in the 
|
|                      |        | PST         |                      |  results section.    
|
+----------------------+--------+-------------+----------------------+----------------------
+
 documented in this encounter
 
 Results
 Urinalysis with Microscopic with Culture if Indicated (2015  5:35 AM PST)
 
+-------------+--------------------+----------------+------------+--------------+
| Component   | Value              | Ref Range      | Performed  | Pathologist  |
 
|             |                    |                | At         | Signature    |
+-------------+--------------------+----------------+------------+--------------+
| Source      | Clean Catch / VOID |                | EXTERNAL   |              |
|             |                    |                | LAB        |              |
+-------------+--------------------+----------------+------------+--------------+
| Clarity     | HAZY               | CLEAR          | EXTERNAL   |              |
|             |                    |                | LAB        |              |
+-------------+--------------------+----------------+------------+--------------+
| Color,      | DRK YELLOW         | YELLOW         | EXTERNAL   |              |
| Urine       |                    |                | LAB        |              |
+-------------+--------------------+----------------+------------+--------------+
| Specific    | 1.02               | 1.005 - 1.030  | EXTERNAL   |              |
| Gravity,    |                    |                | LAB        |              |
| Urine       |                    |                |            |              |
+-------------+--------------------+----------------+------------+--------------+
| pH, Urine   | 6                  | 5.0 - 7.0 pH   | EXTERNAL   |              |
|             |                    |                | LAB        |              |
+-------------+--------------------+----------------+------------+--------------+
| Leukocyte   | NEGATIVE           | NEGATIVE /uL   | EXTERNAL   |              |
| Esterase,   |                    |                | LAB        |              |
| Urine       |                    |                |            |              |
+-------------+--------------------+----------------+------------+--------------+
| Nitrite,    | NEGATIVE           | NEGATIVE       | EXTERNAL   |              |
| Urine       |                    |                | LAB        |              |
+-------------+--------------------+----------------+------------+--------------+
| Protein,    | NEGATIVE           | NEGATIVE mg/dL | EXTERNAL   |              |
| Urine       |                    |                | LAB        |              |
+-------------+--------------------+----------------+------------+--------------+
| Glucose,    | NORMAL             | NORMAL mg/dL   | EXTERNAL   |              |
| Urine       |                    |                | LAB        |              |
+-------------+--------------------+----------------+------------+--------------+
| Reducing    | NOT REQUIRED       | NEGATIVE       | EXTERNAL   |              |
| Substance,  |                    |                | LAB        |              |
| UA, POC     |                    |                |            |              |
+-------------+--------------------+----------------+------------+--------------+
| Ketones,    | NEGATIVE           | NEGATIVE mg/dL | EXTERNAL   |              |
| Urine       |                    |                | LAB        |              |
+-------------+--------------------+----------------+------------+--------------+
| Urobilinoge | NORMAL             | NORMAL mg/dL   | EXTERNAL   |              |
| n, Urine    |                    |                | LAB        |              |
+-------------+--------------------+----------------+------------+--------------+
| Bilirubin,  | NEGATIVE           | NEGATIVE mg/dL | EXTERNAL   |              |
| Urine       |                    |                | LAB        |              |
+-------------+--------------------+----------------+------------+--------------+
| Blood,      | 250                | NEGATIVE /uL   | EXTERNAL   |              |
| Urine       |                    |                | LAB        |              |
+-------------+--------------------+----------------+------------+--------------+
| White Blood | 0-2                | </= 5 /HPF     | EXTERNAL   |              |
|  Cells,     |                    |                | LAB        |              |
| Urine       |                    |                |            |              |
+-------------+--------------------+----------------+------------+--------------+
| RBC COUNT   | 20-50              | </= 5 PER HPF  | EXTERNAL   |              |
|             |                    |                | LAB        |              |
+-------------+--------------------+----------------+------------+--------------+
| Bacteria,   | NONE SEEN          | NONE SEEN /HPF | EXTERNAL   |              |
| UA          |                    |                | LAB        |              |
+-------------+--------------------+----------------+------------+--------------+
| Culture     | NO                 |                | EXTERNAL   |              |
| Indicated   |                    |                | LAB        |              |
+-------------+--------------------+----------------+------------+--------------+
 
| Squamous    | RARE               | /LPF           | EXTERNAL   |              |
| Epithelial  |                    |                | LAB        |              |
| Cells,      |                    |                |            |              |
| Urine       |                    |                |            |              |
+-------------+--------------------+----------------+------------+--------------+
 
 
 
+----------+
| Specimen |
+----------+
|          |
+----------+
 
 
 
+----------------+---------+--------------------+--------------+
| Performing     | Address | City/State/Zipcode | Phone Number |
| Organization   |         |                    |              |
+----------------+---------+--------------------+--------------+
|   EXTERNAL LAB |         |                    |              |
+----------------+---------+--------------------+--------------+
 Comprehensive Metabolic Panel (2015  5:35 AM PST)
 
+-------------+-------+-----------------+------------+--------------+
| Component   | Value | Ref Range       | Performed  | Pathologist  |
|             |       |                 | At         | Signature    |
+-------------+-------+-----------------+------------+--------------+
| Sodium      | 136   | 132 - 143       | EXTERNAL   |              |
|             |       | mmol/L          | LAB        |              |
+-------------+-------+-----------------+------------+--------------+
| Potassium   | 3.8   | 3.3 - 4.9       | EXTERNAL   |              |
|             |       | mmol/L          | LAB        |              |
+-------------+-------+-----------------+------------+--------------+
| Cl          | 106   | 95 - 108 mmol/L | EXTERNAL   |              |
|             |       |                 | LAB        |              |
+-------------+-------+-----------------+------------+--------------+
| CO2         | 26    | 23 - 34 mmol/L  | EXTERNAL   |              |
|             |       |                 | LAB        |              |
+-------------+-------+-----------------+------------+--------------+
| Anion Gap   | 4     | 7 - 16          | EXTERNAL   |              |
|             |       |                 | LAB        |              |
+-------------+-------+-----------------+------------+--------------+
| Calcium     | 8.6   | 8.3 - 10.0      | EXTERNAL   |              |
|             |       | mg/dL           | LAB        |              |
+-------------+-------+-----------------+------------+--------------+
| Glucose     | 97    | 70 - 110 mg/dL  | EXTERNAL   |              |
|             |       |                 | LAB        |              |
+-------------+-------+-----------------+------------+--------------+
| BUN, Bld    | 15    | 5 - 26 mg/dL    | EXTERNAL   |              |
|             |       |                 | LAB        |              |
+-------------+-------+-----------------+------------+--------------+
| Creatinine  | 0.8   | 0.7 - 1.4 mg/dL | EXTERNAL   |              |
|             |       |                 | LAB        |              |
+-------------+-------+-----------------+------------+--------------+
| BUN/Creatin | 18.8  | 7.0 - 24.0      | EXTERNAL   |              |
| ine Ratio   |       | RATIO           | LAB        |              |
+-------------+-------+-----------------+------------+--------------+
| GFR         | 60    | >=60            | EXTERNAL   |              |
| ESTIMATE    |       | mL/min/1.73m2   | LAB        |              |
 
| (REF)       |       |                 |            |              |
+-------------+-------+-----------------+------------+--------------+
| Bilirubin,  | 0.5   | <=1.2 mg/dL     | EXTERNAL   |              |
| Total       |       |                 | LAB        |              |
+-------------+-------+-----------------+------------+--------------+
| Protein,    | 7.5   | 6.6 - 8.5 g/dL  | EXTERNAL   |              |
| Total       |       |                 | LAB        |              |
+-------------+-------+-----------------+------------+--------------+
| Albumin     | 4.3   | 3.0 - 4.5 g/dL  | EXTERNAL   |              |
|             |       |                 | LAB        |              |
+-------------+-------+-----------------+------------+--------------+
| Alkaline    | 69    | 46 - 116 U/L    | EXTERNAL   |              |
| Phosphatase |       |                 | LAB        |              |
+-------------+-------+-----------------+------------+--------------+
| ALT,        | 30    | 16 - 63 U/L     | EXTERNAL   |              |
| External    |       |                 | LAB        |              |
+-------------+-------+-----------------+------------+--------------+
| AST,        | 28    | <=38 U/L        | EXTERNAL   |              |
| External    |       |                 | LAB        |              |
+-------------+-------+-----------------+------------+--------------+
 
 
 
+----------+
| Specimen |
+----------+
|          |
+----------+
 
 
 
+----------------+---------+--------------------+--------------+
| Performing     | Address | City/State/Zipcode | Phone Number |
| Organization   |         |                    |              |
+----------------+---------+--------------------+--------------+
|   EXTERNAL LAB |         |                    |              |
+----------------+---------+--------------------+--------------+
 CBC w/ Auto Differential (2015  5:35 AM PST)
 
+-------------+-------+----------------+------------+--------------+
| Component   | Value | Ref Range      | Performed  | Pathologist  |
|             |       |                | At         | Signature    |
+-------------+-------+----------------+------------+--------------+
| WBC         | 5     | 4.6 - 10.5     | EXTERNAL   |              |
|             |       | 1000/mm3       | LAB        |              |
+-------------+-------+----------------+------------+--------------+
| RBC         | 5.27  | 4.36 - 5.83    | EXTERNAL   |              |
|             |       | mil/mm3        | LAB        |              |
+-------------+-------+----------------+------------+--------------+
| HGB,        | 16.8  | 13.1 - 17.4    | EXTERNAL   |              |
| External    |       | g/dL           | LAB        |              |
+-------------+-------+----------------+------------+--------------+
| HCT,        | 47.2  | 39.0 - 51.9 %  | EXTERNAL   |              |
| External    |       |                | LAB        |              |
+-------------+-------+----------------+------------+--------------+
| MCV         | 90    | 82 - 96 fl     | EXTERNAL   |              |
|             |       |                | LAB        |              |
+-------------+-------+----------------+------------+--------------+
| MCH         | 31.9  | 27.7 - 32.3 pg | EXTERNAL   |              |
|             |       |                | LAB        |              |
 
+-------------+-------+----------------+------------+--------------+
| MCHC        | 35.6  | 32.0 - 36.9    | EXTERNAL   |              |
|             |       | g/dL           | LAB        |              |
+-------------+-------+----------------+------------+--------------+
| RDW-CV      | 12.9  | <=17.0 %       | EXTERNAL   |              |
|             |       |                | LAB        |              |
+-------------+-------+----------------+------------+--------------+
| RDW-SD      | 41.9  | 34.0 - 57.0 fL | EXTERNAL   |              |
|             |       |                | LAB        |              |
+-------------+-------+----------------+------------+--------------+
| Platelet    | 203   | 150 - 450      | EXTERNAL   |              |
| Count       |       | 1000/mm3       | LAB        |              |
| Plasma      |       |                |            |              |
+-------------+-------+----------------+------------+--------------+
| MPV         | 10.3  | 9.4 - 12.4 FL  | EXTERNAL   |              |
|             |       |                | LAB        |              |
+-------------+-------+----------------+------------+--------------+
| % Segmented | 63.4  | 42.0 - 76.0 %  | EXTERNAL   |              |
|             |       |                | LAB        |              |
| Neutrophils |       |                |            |              |
+-------------+-------+----------------+------------+--------------+
| %           | 24.9  | 20.0 - 40.0 %  | EXTERNAL   |              |
| Lymphocytes |       |                | LAB        |              |
+-------------+-------+----------------+------------+--------------+
| % Monocytes | 9.5   | 3.0 - 13.0 %   | EXTERNAL   |              |
|             |       |                | LAB        |              |
+-------------+-------+----------------+------------+--------------+
| %           | 1.8   | 0.0 - 7.0 %    | EXTERNAL   |              |
| Eosinophils |       |                | LAB        |              |
+-------------+-------+----------------+------------+--------------+
| % Basophils | 0.4   | 0.0 - 2.0 %    | EXTERNAL   |              |
|             |       |                | LAB        |              |
+-------------+-------+----------------+------------+--------------+
| Absolute    | 3.15  | 2.80 - 7.70    | EXTERNAL   |              |
| Neutrophils |       | 1000/mm3       | LAB        |              |
+-------------+-------+----------------+------------+--------------+
| Absolute    | 1.24  | 1.20 - 3.30    | EXTERNAL   |              |
| Lymphocytes |       | 1000/mm3       | LAB        |              |
+-------------+-------+----------------+------------+--------------+
| Absolute    | 0.47  | 0.00 - 0.80    | EXTERNAL   |              |
| Monocytes   |       | 1000/mm3       | LAB        |              |
+-------------+-------+----------------+------------+--------------+
| Absolute    | 0.09  | 0.00 - 0.70    | EXTERNAL   |              |
| Eosinophils |       | 1000/mm3       | LAB        |              |
+-------------+-------+----------------+------------+--------------+
| Absolute    | 0.02  | 0.00 - 0.20    | EXTERNAL   |              |
| Basophils   |       | 1000/mm3       | LAB        |              |
+-------------+-------+----------------+------------+--------------+
| SLIDE       | NO    |                | EXTERNAL   |              |
| REVIEWED    |       |                | LAB        |              |
+-------------+-------+----------------+------------+--------------+
 
 
 
+----------+
| Specimen |
+----------+
|          |
+----------+
 
 
 
 
+----------------+---------+--------------------+--------------+
| Performing     | Address | City/State/Zipcode | Phone Number |
| Organization   |         |                    |              |
+----------------+---------+--------------------+--------------+
|   EXTERNAL LAB |         |                    |              |
+----------------+---------+--------------------+--------------+
 CT Abdomen Pelvis wo Contrast (2015  5:18 AM PST)
 
+----------+
| Specimen |
+----------+
|          |
+----------+
 
 
 
+------------------------------------------------------------------------+--------------+
| Narrative                                                              | Performed At |
+------------------------------------------------------------------------+--------------+
|      **ORIGINAL**     CT OF THE ABDOMEN AND PELVIS WITHOUT CONTRAST:   |              |
|    HISTORY:  Abdominal pain.     TECHNIQUE:  5-mm axial slices were    |              |
| acquired through the abdomen and pelvis without contrast.              |              |
| COMPARISON:  2009.     FINDINGS:  Heart size is normal.    |              |
| Lung bases are clear. No pericardial or pleural effusion.              |              |
| Evaluation of abdominal viscera is limited due to the lack of          |              |
| intravenous contrast.   3-mm hypodensity near the liver dome is noted  |              |
| on today's study, image #27 of series #3. This likely represents a     |              |
| small cyst.   A 14-mm subcapsular hypodensity in the   posterior right |              |
|  liver lobe on image #49.   Retrospectively this is unchanged compared |              |
|  to the prior study and the stability is consistent with a benign      |              |
| process.   The gallbladder, pancreas and spleen are unremarkable.      |              |
|   Adrenal glands are symmetric.     The inferior pole of the right     |              |
| kidney contains a 3.5 mm stone.   This stone previously measured 1 mm. |              |
|  The other right-sided renal stones seen on the prior study are no     |              |
| longer within the kidney.   No right ureterolith is identified.   The  |              |
| left kidney has a 1   mm stone centrally and at least two 1 mm stones  |              |
| in the inferior pole.   There are several additional faint             |              |
| hyperdensities which are nonspecific and may represent early stone     |              |
| formation.   No hydronephrosis, no hydroureter.   The bladder is       |              |
| decompressed.   There is a 3-mm calcification within the distal penis  |              |
| in the expected region of the urethra.   A stone within the urethra    |              |
| should be considered.   Prostate gland and seminal vesicles are age    |              |
| appropriate.     The bowel gas pattern is nonobstructive.     Aorta is |              |
|  normal in course and caliber. No pathologically enlarged lymph nodes. |              |
|    No acute bone process.     IMPRESSION:  1. Bilateral                |              |
| nephrolithiasis without evidence of obstruction.  2. Benign appearing  |              |
| liver hypodensities.        D: 2015  Job#: [id number]     Read By: |              |
|  JOE LIMA MD     Released By: JOE LIMA MD  Date:   |              |
|   2015 09:49                                                     |              |
+------------------------------------------------------------------------+--------------+
 
 
 
+-------------------------------------------------------------------------------------------
--------------------------------------------------------------------------------------------
------------------+
| Procedure Note                                                                            
                                                                                            
 
                  |
+-------------------------------------------------------------------------------------------
--------------------------------------------------------------------------------------------
------------------+
|   Alfonso, Rad Results In - 2017  8:49 PM PST   **ORIGINAL** CT OF THE ABDOMEN AND      
                                                                                            
                  |
| PELVIS WITHOUT CONTRAST: HISTORY:Abdominal pain. TECHNIQUE:5-mm axial slices were         
                                                                                            
                  |
| acquired through the abdomen and pelvis without contrast. COMPARISON:2009.    
                                                                                            
                  |
| FINDINGS:Heart size is normal. Lung bases are clear. No pericardial or pleural effusion.  
                                                                                            
                  |
|  Evaluation of abdominal viscera is limited due to the lack of intravenous contrast.      
                                                                                            
                  |
| 3-mm hypodensity near the liver dome is noted on today's study, image #27 of series #3.   
                                                                                            
                  |
| This likely represents a small cyst.  A 14-mm subcapsular hypodensity in the posterior    
                                                                                            
                  |
| right liver lobe on image #49.  Retrospectively this is unchanged compared to the prior   
                                                                                            
                  |
| study and the stability is consistent with a benign process.  The gallbladder, pancreas   
                                                                                            
                  |
| and spleen are unremarkable.  Adrenal glands are symmetric. The inferior pole of the      
                                                                                            
                  |
| right kidney contains a 3.5 mm stone.  This stone previously measured 1 mm. The other     
                                                                                            
                  |
| right-sided renal stones seen on the prior study are no longer within the kidney.  No     
                                                                                            
                  |
| right ureterolith is identified.  The left kidney has a 1 mm stone centrally and at       
                                                                                            
                  |
| least two 1 mm stones in the inferior pole.  There are several additional faint           
                                                                                            
                  |
| hyperdensities which are nonspecific and may represent early stone formation.  No         
                                                                                            
                  |
| hydronephrosis, no hydroureter.  The bladder is decompressed. There is a 3-mm             
                                                                                            
                  |
| calcification within the distal penis in the expected region of the urethra.  A stone     
                                                                                            
                  |
| within the urethra should be considered.  Prostate gland and seminal vesicles are age     
                                                                                            
                  |
| appropriate. The bowel gas pattern is nonobstructive. Aorta is normal in course and       
                                                                                            
 
                  |
| caliber. No pathologically enlarged lymph nodes.  No acute bone process. IMPRESSION:1.    
                                                                                            
                  |
| Bilateral nephrolithiasis without evidence of obstruction.2. Benign appearing liver       
                                                                                            
                  |
| hypodensities.  D: 2015Job#: [id number] Read By: JOE LIMA MD Released By:   
                                                                                            
                  |
| JOE LIMA MDDate:  2015 09:49                                              
                                                                                            
                  |
|There is a 3-mm calcification within the distal penis in the expected region of the urethra
.  A stone within the urethra should be considered.  Prostate gland and seminal vesicles are
 age appropriate. |
|                                                                                           
                                                                                            
                  |
|The bowel gas pattern is nonobstructive.                                                   
                                                                                            
                  |
|                                                                                           
                                                                                            
                  |
|Aorta is normal in course and caliber. No pathologically enlarged lymph nodes.  No acute beverly
ne process.                                                                                 
                  |
|                                                                                           
                                                                                            
                  |
|IMPRESSION:                                                                                
                                                                                            
                 |
|1. Bilateral nephrolithiasis without evidence of obstruction.                              
                                                                                            
                  |
|2. Benign appearing liver hypodensities.                                                   
                                                                                            
                  |
|                                                                                           
                                                                                            
                  |
|                                                                                           
                                                                                            
                  |
|D: 2015                                                                              
                                                                                            
                  |
|Job#: [id number]                                                                             
                                                                                            
                  |
|                                                                                           
                                                                                            
                  |
|Read By: JOE LIMA MD                                                             
                                                                                            
                  |
|                                                                                           
                                                                                            
 
                  |
|Released By: JOE LIMA MD                                                         
                                                                                            
                  |
|Date:  2015 09:49                                                                    
                                                                                            
                  |
|                                                                                           
                                                                                            
                  |
|                                                                                           
                                                                                            
                  |
+-------------------------------------------------------------------------------------------
--------------------------------------------------------------------------------------------
------------------+
 documented in this encounter
 
 Visit Diagnoses
 Not on filedocumented in this encounter

## 2020-06-07 NOTE — XMS
Encounter Summary
  Created on: 2020
 
 Yony Twin Zane
 External Reference #: [id number]
 : 88
 Sex: Male
 
 Demographics
 
 
+-----------------------+--------------------+
| Address               | [address]          |
|                       | [place]  [address] |
+-----------------------+--------------------+
| Home Phone            | [phone]    |
+-----------------------+--------------------+
| Preferred Language    | Unknown            |
+-----------------------+--------------------+
| Marital Status        |             |
+-----------------------+--------------------+
| Religion Affiliation | Unknown            |
+-----------------------+--------------------+
| Race                  | Unknown            |
+-----------------------+--------------------+
| Ethnic Group          | Unknown            |
+-----------------------+--------------------+
 
 
 Author
 
 
+--------------+--------------------------------------------+
| Author       | [facility]  |
|              | and [place]                                |
+--------------+--------------------------------------------+
| Organization | [facility]  |
|              | and [place]                                |
+--------------+--------------------------------------------+
| Address      | Unknown                                    |
+--------------+--------------------------------------------+
| Phone        | Unavailable                                |
+--------------+--------------------------------------------+
 
 
 
 Support
 
 
+------------------+--------------+--------------------+-----------------+
| Name             | Relationship | Address            | Phone           |
+------------------+--------------+--------------------+-----------------+
| Glenda Bhakta | ECON         | [address]          | [phone] |
|                  |              | [place]  [address] |                 |
+------------------+--------------+--------------------+-----------------+
 
 
 
 
 Care Team Providers
 
 
+-----------------------+------+-------------+
| Care Team Member Name | Role | Phone       |
+-----------------------+------+-------------+
 PCP  | Unavailable |
+-----------------------+------+-------------+
 
 
 
 Encounter Details
 
 
+--------+-----------+---------------------+----------------------+-------------+
| Date   | Type      | Department          | Care Team            | Description |
+--------+-----------+---------------------+----------------------+-------------+
| 10/20/ | Hospital  |   [facility]      |   Killian Saldaña      |             |
|    | Encounter | HOSPITAL EMERGENCY  | [address]    |             |
|        |           | CENTER  [address]  | [facility]      |             |
|        |           | [address] [place]   | [place] [address] |             |
|        |           | [address]          |   [phone]       |             |
|        |           | [phone]        | [phone] (Fax)   |             |
+--------+-----------+---------------------+----------------------+-------------+
 
 
 
 Social History
 
 
+----------------+-------+-----------+--------+------+
| Tobacco Use    | Types | Packs/Day | Years  | Date |
|                |       |           | Used   |      |
+----------------+-------+-----------+--------+------+
| Never Assessed |       |           |        |      |
+----------------+-------+-----------+--------+------+
 
 
 
+------------------+---------------+
| Sex Assigned at  | Date Recorded |
| Birth            |               |
+------------------+---------------+
| Not on file      |               |
+------------------+---------------+
 
 
 
+----------------+-------------+-------------+
| Job Start Date | Occupation  | Industry    |
+----------------+-------------+-------------+
| Not on file    | Not on file | Not on file |
+----------------+-------------+-------------+
 
 
 
+----------------+--------------+------------+
| Travel History | Travel Start | Travel End |
+----------------+--------------+------------+
 
 
 
 
+-------------------------------------+
| No recent travel history available. |
+-------------------------------------+
 documented as of this encounter
 
 Plan of Treatment
 Not on filedocumented as of this encounter
 
 Visit Diagnoses
 Not on filedocumented in this encounter

## 2020-06-07 NOTE — XMS
Encounter Summary
  Created on: 2020
 
 Yony Twin Zane
 External Reference #: [id number]
 : 88
 Sex: Male
 
 Demographics
 
 
+-----------------------+--------------------+
| Address               | [address]          |
|                       | [place]  [address] |
+-----------------------+--------------------+
| Home Phone            | [phone]    |
+-----------------------+--------------------+
| Preferred Language    | Unknown            |
+-----------------------+--------------------+
| Marital Status        |             |
+-----------------------+--------------------+
| Mu-ism Affiliation | Unknown            |
+-----------------------+--------------------+
| Race                  | Unknown            |
+-----------------------+--------------------+
| Ethnic Group          | Unknown            |
+-----------------------+--------------------+
 
 
 Author
 
 
+--------------+--------------------------------------------+
| Author       | [facility]  |
|              | and [place]                                |
+--------------+--------------------------------------------+
| Organization | [facility]  |
|              | and [place]                                |
+--------------+--------------------------------------------+
| Address      | Unknown                                    |
+--------------+--------------------------------------------+
| Phone        | Unavailable                                |
+--------------+--------------------------------------------+
 
 
 
 Support
 
 
+------------------+--------------+--------------------+-----------------+
| Name             | Relationship | Address            | Phone           |
+------------------+--------------+--------------------+-----------------+
| Glenda Bhakta | ECON         | [address]          | [phone] |
|                  |              | [place]  [address] |                 |
+------------------+--------------+--------------------+-----------------+
 
 
 
 
 Care Team Providers
 
 
+-----------------------+------+-------------+
| Care Team Member Name | Role | Phone       |
+-----------------------+------+-------------+
 PCP  | Unavailable |
+-----------------------+------+-------------+
 
 
 
 Encounter Details
 
 
+--------+-----------+---------------------+----------------------+-------------+
| Date   | Type      | Department          | Care Team            | Description |
+--------+-----------+---------------------+----------------------+-------------+
| / | Hospital  |   [facility]      |   Noble Owens  |             |
|    | Encounter | HOSPITAL EMERGENCY  | [address]      |             |
|        |           | CENTER  [address]  | [address]        |             |
|        |           | [address] [place]   | [place] [address]     |             |
|        |           | [address]          | [phone]         |             |
|        |           | [phone]        | [phone] (Fax)   |             |
+--------+-----------+---------------------+----------------------+-------------+
 
 
 
 Social History
 
 
+----------------+-------+-----------+--------+------+
| Tobacco Use    | Types | Packs/Day | Years  | Date |
|                |       |           | Used   |      |
+----------------+-------+-----------+--------+------+
| Never Assessed |       |           |        |      |
+----------------+-------+-----------+--------+------+
 
 
 
+------------------+---------------+
| Sex Assigned at  | Date Recorded |
| Birth            |               |
+------------------+---------------+
| Not on file      |               |
+------------------+---------------+
 
 
 
+----------------+-------------+-------------+
| Job Start Date | Occupation  | Industry    |
+----------------+-------------+-------------+
| Not on file    | Not on file | Not on file |
+----------------+-------------+-------------+
 
 
 
+----------------+--------------+------------+
| Travel History | Travel Start | Travel End |
+----------------+--------------+------------+
 
 
 
 
+-------------------------------------+
| No recent travel history available. |
+-------------------------------------+
 documented as of this encounter
 
 Plan of Treatment
 Not on filedocumented as of this encounter
 
 Visit Diagnoses
 Not on filedocumented in this encounter

## 2020-06-07 NOTE — XMS
Encounter Summary
  Created on: 2020
 
 Yony Twin Zane
 External Reference #: [id number]
 : 88
 Sex: Male
 
 Demographics
 
 
+-----------------------+--------------------+
| Address               | [address]          |
|                       | [place]  [address] |
+-----------------------+--------------------+
| Home Phone            | [phone]    |
+-----------------------+--------------------+
| Preferred Language    | Unknown            |
+-----------------------+--------------------+
| Marital Status        |             |
+-----------------------+--------------------+
| Muslim Affiliation | Unknown            |
+-----------------------+--------------------+
| Race                  | Unknown            |
+-----------------------+--------------------+
| Ethnic Group          | Unknown            |
+-----------------------+--------------------+
 
 
 Author
 
 
+--------------+--------------------------------------------+
| Author       | [facility]  |
|              | and [place]                                |
+--------------+--------------------------------------------+
| Organization | [facility]  |
|              | and [place]                                |
+--------------+--------------------------------------------+
| Address      | Unknown                                    |
+--------------+--------------------------------------------+
| Phone        | Unavailable                                |
+--------------+--------------------------------------------+
 
 
 
 Support
 
 
+------------------+--------------+--------------------+-----------------+
| Name             | Relationship | Address            | Phone           |
+------------------+--------------+--------------------+-----------------+
| Glenda Bhakta | ECON         | [address]          | [phone] |
|                  |              | [place]  [id number] |                 |
+------------------+--------------+--------------------+-----------------+
 
 
 
 
 Care Team Providers
 
 
+-----------------------+------+-----------------+
| Care Team Member Name | Role | Phone           |
+-----------------------+------+-----------------+
| Brittney Wall MD | PCP  | [phone] |
+-----------------------+------+-----------------+
 
 
 
 Reason for Visit
 
 
+-----------+---------------------------------+
| Reason    | Comments                        |
+-----------+---------------------------------+
| Follow-up | margaux fields's appointment 10/7 |
+-----------+---------------------------------+
 
 
 
 Encounter Details
 
 
+--------+-----------+---------------------+----------------------+----------------------+
| Date   | Type      | Department          | Care Team            | Description          |
+--------+-----------+---------------------+----------------------+----------------------+
| 10/07/ | Telephone |   [facility]     |   Ebony Mccloud,   | Follow-up (cancel    |
|    |           | NEUROLOGY  [address]     | MD  [address]    | today's appointment  |
|        |           | [address]   | [address]  [address]       | 10/7)                |
|        |           | [place], [place]        | [place] [address]  |                      |
|        |           | [address]          |  [phone]        |                      |
|        |           | [phone]        | [phone] (Fax)   |                      |
+--------+-----------+---------------------+----------------------+----------------------+
 
 
 
 Social History
 
 
+---------------+-------+-----------+--------+------+
| Tobacco Use   | Types | Packs/Day | Years  | Date |
|               |       |           | Used   |      |
+---------------+-------+-----------+--------+------+
| Former Smoker |       |           |        |      |
+---------------+-------+-----------+--------+------+
 
 
 
+------------------+---------------+
| Sex Assigned at  | Date Recorded |
| Birth            |               |
+------------------+---------------+
| Not on file      |               |
+------------------+---------------+
 
 
 
+----------------+-------------+-------------+
 
| Job Start Date | Occupation  | Industry    |
+----------------+-------------+-------------+
| Not on file    | Not on file | Not on file |
+----------------+-------------+-------------+
 
 
 
+----------------+--------------+------------+
| Travel History | Travel Start | Travel End |
+----------------+--------------+------------+
 
 
 
+-------------------------------------+
| No recent travel history available. |
+-------------------------------------+
 documented as of this encounter
 
 Plan of Treatment
 Not on filedocumented as of this encounter
 
 Visit Diagnoses
 Not on filedocumented in this encounter

## 2020-06-07 NOTE — XMS
Encounter Summary
  Created on: 2020
 
 Yony Twin Zane
 External Reference #: [id number]
 : 88
 Sex: Male
 
 Demographics
 
 
+-----------------------+--------------------+
| Address               | [address]          |
|                       | [place]  [address] |
+-----------------------+--------------------+
| Home Phone            | [phone]    |
+-----------------------+--------------------+
| Preferred Language    | Unknown            |
+-----------------------+--------------------+
| Marital Status        |             |
+-----------------------+--------------------+
| [employer] Affiliation | Unknown            |
+-----------------------+--------------------+
| Race                  | Unknown            |
+-----------------------+--------------------+
| Ethnic Group          | Unknown            |
+-----------------------+--------------------+
 
 
 Author
 
 
+--------------+--------------------------------------------+
| Author       | [facility]  |
|              | and [place]                                |
+--------------+--------------------------------------------+
| Organization | [facility]  |
|              | and [place]                                |
+--------------+--------------------------------------------+
| Address      | Unknown                                    |
+--------------+--------------------------------------------+
| Phone        | Unavailable                                |
+--------------+--------------------------------------------+
 
 
 
 Support
 
 
+------------------+--------------+--------------------+-----------------+
| Name             | Relationship | Address            | Phone           |
+------------------+--------------+--------------------+-----------------+
| Glenda Bhakta | ECON         | [address]          | [phone] |
|                  |              | [place]  [address] |                 |
+------------------+--------------+--------------------+-----------------+
 
 
 
 
 Care Team Providers
 
 
+-----------------------+------+-------------+
| Care Team Member Name | Role | Phone       |
+-----------------------+------+-------------+
 PCP  | Unavailable |
+-----------------------+------+-------------+
 
 
 
 Encounter Details
 
 
+--------+-----------+----------------------+----------------------+----------------------+
| Date   | Type      | Department           | Care Team            | Description          |
+--------+-----------+----------------------+----------------------+----------------------+
| / | Hospital  |   [facility]     |   Conversion         | Abnormal brain MRI;  |
| 2019   | Encounter | [facility]  [address] | Transaction,         | Spells of decreased  |
|        |           |  [address] | Provider Unknown     | attentiveness        |
|        |           |   ESVIN WONG       | [phone]         |                      |
|        |           | [address]           | [phone] (Fax)   |                      |
|        |           | [phone]         | Ebony Mccloud MD  |                      |
|        |           |                      |  [address] |                      |
|        |           |                      |   [address]            |                      |
|        |           |                      | [place] [address]  |                      |
|        |           |                      |  [phone]        |                      |
|        |           |                      | [phone] (Fax)   |                      |
+--------+-----------+----------------------+----------------------+----------------------+
 
 
 
 Social History
 
 
+----------------+-------+-----------+--------+------+
| Tobacco Use    | Types | Packs/Day | Years  | Date |
|                |       |           | Used   |      |
+----------------+-------+-----------+--------+------+
| Never Assessed |       |           |        |      |
+----------------+-------+-----------+--------+------+
 
 
 
+------------------+---------------+
| Sex Assigned at  | Date Recorded |
| Birth            |               |
+------------------+---------------+
| Not on file      |               |
+------------------+---------------+
 
 
 
+----------------+-------------+-------------+
| Job Start Date | Occupation  | Industry    |
+----------------+-------------+-------------+
| Not on file    | Not on file | Not on file |
+----------------+-------------+-------------+
 
 
 
 
+----------------+--------------+------------+
| Travel History | Travel Start | Travel End |
+----------------+--------------+------------+
 
 
 
+-------------------------------------+
| No recent travel history available. |
+-------------------------------------+
 documented as of this encounter
 
 Last Filed Vital Signs
 
 
+-------------------+------------------+----------------------+----------+
| Vital Sign        | Reading          | Time Taken           | Comments |
+-------------------+------------------+----------------------+----------+
| Blood Pressure    | -                | -                    |          |
+-------------------+------------------+----------------------+----------+
| Pulse             | -                | -                    |          |
+-------------------+------------------+----------------------+----------+
| Temperature       | -                | -                    |          |
+-------------------+------------------+----------------------+----------+
| Respiratory Rate  | -                | -                    |          |
+-------------------+------------------+----------------------+----------+
| Oxygen Saturation | -                | -                    |          |
+-------------------+------------------+----------------------+----------+
| Inhaled Oxygen    | -                | -                    |          |
| Concentration     |                  |                      |          |
+-------------------+------------------+----------------------+----------+
| Weight            | 81.6 kg (180 lb) | 2019  2:25 PM  |          |
|                   |                  | PDT                  |          |
+-------------------+------------------+----------------------+----------+
| Height            | -                | -                    |          |
+-------------------+------------------+----------------------+----------+
| Body Mass Index   | 28.19            | 2019 10:49 AM  |          |
|                   |                  | PDT                  |          |
+-------------------+------------------+----------------------+----------+
 documented in this encounter
 
 Medications at Time of Discharge
 
 
+---------------------+---------------------+-----------+---------+----------+----------+
| Medication          | Sig                 | Dispensed | Refills | Start    | End Date |
|                     |                     |           |         | Date     |          |
+---------------------+---------------------+-----------+---------+----------+----------+
|   ibuprofen         | take 1 tablet by    |           | 0       | 20 |          |
| (ADVIL,MOTRIN) 600  | mouth with food or  |           |         | 19       |          |
| MG tablet           | milk three times a  |           |         |          |          |
|                     | day if needed       |           |         |          |          |
+---------------------+---------------------+-----------+---------+----------+----------+
 documented as of this encounter
 
 Plan of Treatment
 Not on filedocumented as of this encounter
 
 Procedures
 
 
 
+-----------------+--------+-------------+----------------------+----------------------+
| Procedure Name  | Priori | Date/Time   | Associated Diagnosis | Comments             |
|                 | ty     |             |                      |                      |
+-----------------+--------+-------------+----------------------+----------------------+
| MRI BRAIN W WO  | Routin | 2019  |                      |   Results for this   |
| CONTRAST        | e      |  3:25 PM    |                      | procedure are in the |
|                 |        | PDT         |                      |  results section.    |
+-----------------+--------+-------------+----------------------+----------------------+
 documented in this encounter
 
 Results
 MRI Brain w wo Contrast (2019  3:25 PM PDT)
 
+----------+
| Specimen |
+----------+
|          |
+----------+
 
 
 
+-------------------------------------------------------------------+--------------+
| Impressions                                                       | Performed At |
+-------------------------------------------------------------------+--------------+
|   1. No evidence of acute ischemia.  2. Normal contrast enhanced  |              |
| appearance of the brain.  Signed by: MIGUEL Darby Richard  Sign  |              |
| Date/Time: 2019 7:48 AM                                     |              |
+-------------------------------------------------------------------+--------------+
 
 
 
+------------------------------------------------------------------------+--------------+
| Narrative                                                              | Performed At |
+------------------------------------------------------------------------+--------------+
|   MRI BRAIN WITHOUT AND WITH CONTRAST  CLINICAL INFORMATION:  Abnormal |              |
|  brain MRI Spells of decreased attentiveness  COMPARISON:  None        |              |
| PROCEDURE:  Sagittal T1, axial FLAIR, axial T2, axial T1, axial        |              |
| gradient  susceptibility, axial T1 enhanced, coronal T1 enhanced and   |              |
| axial DWI.  Contrast: 8.0ml gadavist IV.  FINDINGS:  Brain: No         |              |
| intracranial hemorrhage. No midline shift, pathologic  enhancement, or |              |
|  mass lesion.   No cerebral edema, restricted diffusion,  or evidence  |              |
| of acute infarct.  Ventricles and extra-axial fluid spaces: Normal.    |              |
| Sella, suprasellar cistern, and orbits: Normal.  Major vascular flow   |              |
| voids: Normal.  Calvarium and extracranial soft tissues: Normal.       |              |
| Paranasal sinuses and mastoid air cells: Mild mucosal thickening is    |              |
| seen of the bilateral maxillary sinus.                                 |              |
+------------------------------------------------------------------------+--------------+
 
 
 
+--------------------------------------------------------------------------------------+
| Procedure Note                                                                       |
+--------------------------------------------------------------------------------------+
|   Fadi Hamilton Conversion - 2019 11:16 PM PDT  MRI BRAIN WITHOUT AND WITH CONTRAST |
| CLINICAL INFORMATION:                                                                |
| Abnormal brain MRI Spells of decreased attentiveness                                 |
| COMPARISON:                                                                          |
| None                                                                                 |
| PROCEDURE:                                                                           |
 
| Sagittal T1, axial FLAIR, axial T2, axial T1, axial gradient                         |
| susceptibility, axial T1 enhanced, coronal T1 enhanced and axial DWI.                |
| Contrast: 8.0ml gadavist IV.                                                         |
| FINDINGS:                                                                            |
| Brain: No intracranial hemorrhage. No midline shift, pathologic                      |
| enhancement, or mass lesion.  No cerebral edema, restricted diffusion,               |
| or evidence of acute infarct.                                                        |
| Ventricles and extra-axial fluid spaces: Normal.                                     |
| Sella, suprasellar cistern, and orbits: Normal.                                      |
| Major vascular flow voids: Normal.                                                   |
| Calvarium and extracranial soft tissues: Normal.                                     |
| Paranasal sinuses and mastoid air cells: Mild mucosal thickening is                  |
| seen of the bilateral maxillary sinus.                                               |
| IMPRESSION:                                                                          |
| 1. No evidence of acute ischemia.                                                    |
| 2. Normal contrast enhanced appearance of the brain.                                 |
| Signed by: MIGUEL Darby Richard                                                     |
| Sign Date/Time: 2019 7:48 AM                                                   |
+--------------------------------------------------------------------------------------+
 documented in this encounter
 
 Visit Diagnoses
 
 
+----------------------------------------------------------------------------------+
| Diagnosis                                                                        |
+----------------------------------------------------------------------------------+
|   Abnormal brain MRI  Nonspecific (abnormal) findings on radiological and other  |
| examination of skull and head                                                    |
+----------------------------------------------------------------------------------+
|   Spells of decreased attentiveness  Other general symptoms                      |
+----------------------------------------------------------------------------------+
 documented in this encounter

## 2020-06-07 NOTE — XMS
Encounter Summary
  Created on: 2020
 
 Yony Twin Zane
 External Reference #: [id number]
 : 88
 Sex: Male
 
 Demographics
 
 
+-----------------------+--------------------+
| Address               | [address]          |
|                       | [place]  [address] |
+-----------------------+--------------------+
| Home Phone            | [phone]    |
+-----------------------+--------------------+
| Preferred Language    | Unknown            |
+-----------------------+--------------------+
| Marital Status        |             |
+-----------------------+--------------------+
| Anabaptist Affiliation | Unknown            |
+-----------------------+--------------------+
| Race                  | Unknown            |
+-----------------------+--------------------+
| Ethnic Group          | Unknown            |
+-----------------------+--------------------+
 
 
 Author
 
 
+--------------+--------------------------------------------+
| Author       | [facility]  |
|              | and [place]                                |
+--------------+--------------------------------------------+
| Organization | [facility]  |
|              | and [place]                                |
+--------------+--------------------------------------------+
| Address      | Unknown                                    |
+--------------+--------------------------------------------+
| Phone        | Unavailable                                |
+--------------+--------------------------------------------+
 
 
 
 Support
 
 
+------------------+--------------+--------------------+-----------------+
| Name             | Relationship | Address            | Phone           |
+------------------+--------------+--------------------+-----------------+
| Glenda Bhakta | ECON         | [address]          | [phone] |
|                  |              | [place]  [address] |                 |
+------------------+--------------+--------------------+-----------------+
 
 
 
 
 Care Team Providers
 
 
+-----------------------+------+-----------------+
| Care Team Member Name | Role | Phone           |
+-----------------------+------+-----------------+
| Brittney Wall MD | PCP  | [phone] |
+-----------------------+------+-----------------+
 
 
 
 Encounter Details
 
 
+--------+-------------+---------------------+----------------------+----------------------+
| Date   | Type        | Department          | Care Team            | Description          |
+--------+-------------+---------------------+----------------------+----------------------+
| / | Orders Only |   [facility]     |   Ebony Mccloud,   | Other symptoms and   |
| 2019   |             | NEUROLOGY  [address]     | [address]  [address]    | signs involving      |
|        |             | [address]   | [address]       | cognitive functions  |
|        |             | [place]        | [place] [address]  | and awareness        |
|        |             | [address]          |  [phone]        |                      |
|        |             | [phone]        | [phone] (Fax)   |                      |
+--------+-------------+---------------------+----------------------+----------------------+
 
 
 
 Social History
 
 
+---------------+-------+-----------+--------+------+
| Tobacco Use   | Types | Packs/Day | Years  | Date |
|               |       |           | Used   |      |
+---------------+-------+-----------+--------+------+
| Former Smoker |       |           |        |      |
+---------------+-------+-----------+--------+------+
 
 
 
+------------------+---------------+
| Sex Assigned at  | Date Recorded |
| Birth            |               |
+------------------+---------------+
| Not on file      |               |
+------------------+---------------+
 
 
 
+----------------+-------------+-------------+
| Job Start Date | Occupation  | Industry    |
+----------------+-------------+-------------+
| Not on file    | Not on file | Not on file |
+----------------+-------------+-------------+
 
 
 
+----------------+--------------+------------+
| Travel History | Travel Start | Travel End |
+----------------+--------------+------------+
 
 
 
 
+-------------------------------------+
| No recent travel history available. |
+-------------------------------------+
 documented as of this encounter
 
 Plan of Treatment
 
 
+-------------------+------+--------+----------------------+----------------------+
| Name              | Type | Priori | Associated Diagnoses | Order Schedule       |
|                   |      | ty     |                      |                      |
+-------------------+------+--------+----------------------+----------------------+
| TSH               | Lab  | Routin |   Other symptoms and | Expected:            |
|                   |      | e      |  signs involving     | 2019, Expires: |
|                   |      |        | cognitive functions  |  2020          |
|                   |      |        | and awareness        |                      |
+-------------------+------+--------+----------------------+----------------------+
| Vitamin B-12 and  | Lab  | Routin |   Other symptoms and | Expected:            |
| Folate            |      | e      |  signs involving     | 2019, Expires: |
|                   |      |        | cognitive functions  |  2020          |
|                   |      |        | and awareness        |                      |
+-------------------+------+--------+----------------------+----------------------+
 documented as of this encounter
 
 Visit Diagnoses
 
 
+------------------------------------------------------------------------+
| Diagnosis                                                              |
+------------------------------------------------------------------------+
|   Other symptoms and signs involving cognitive functions and awareness |
+------------------------------------------------------------------------+
 documented in this encounter

## 2020-06-07 NOTE — XMS
Encounter Summary
  Created on: 2020
 
 Yony Twin Zane
 External Reference #: [id number]
 : 88
 Sex: Male
 
 Demographics
 
 
+-----------------------+--------------------+
| Address               | [address]          |
|                       | [place]  [address] |
+-----------------------+--------------------+
| Home Phone            | [phone]    |
+-----------------------+--------------------+
| Preferred Language    | Unknown            |
+-----------------------+--------------------+
| Marital Status        |             |
+-----------------------+--------------------+
| Bahai Affiliation | Unknown            |
+-----------------------+--------------------+
| Race                  | Unknown            |
+-----------------------+--------------------+
| Ethnic Group          | Unknown            |
+-----------------------+--------------------+
 
 
 Author
 
 
+--------------+--------------------------------------------+
| Author       | [facility]  |
|              | and [place]                                |
+--------------+--------------------------------------------+
| Organization | [facility]  |
|              | and [place]                                |
+--------------+--------------------------------------------+
| Address      | Unknown                                    |
+--------------+--------------------------------------------+
| Phone        | Unavailable                                |
+--------------+--------------------------------------------+
 
 
 
 Support
 
 
+------------------+--------------+--------------------+-----------------+
| Name             | Relationship | Address            | Phone           |
+------------------+--------------+--------------------+-----------------+
| Glenda Bhakta | ECON         | [address]          | [phone] |
|                  |              | [place]  [address] |                 |
+------------------+--------------+--------------------+-----------------+
 
 
 
 
 Care Team Providers
 
 
+-----------------------+------+-------------+
| Care Team Member Name | Role | Phone       |
+-----------------------+------+-------------+
 PCP  | Unavailable |
+-----------------------+------+-------------+
 
 
 
 Encounter Details
 
 
+--------+-----------+---------------------+----------------------+-------------+
| Date   | Type      | Department          | Care Team            | Description |
+--------+-----------+---------------------+----------------------+-------------+
| 10/20/ | Hospital  |   [facility]      |   Killian Saldaña      |             |
|    | Encounter | HOSPITAL EMERGENCY  | [address]    |             |
|        |           | CENTER  [address]  | [facility]      |             |
|        |           | [address] [place]   | [place] [address] |             |
|        |           | [address]          |   [phone]       |             |
|        |           | [phone]        | [phone] (Fax)   |             |
+--------+-----------+---------------------+----------------------+-------------+
 
 
 
 Social History
 
 
+----------------+-------+-----------+--------+------+
| Tobacco Use    | Types | Packs/Day | Years  | Date |
|                |       |           | Used   |      |
+----------------+-------+-----------+--------+------+
| Never Assessed |       |           |        |      |
+----------------+-------+-----------+--------+------+
 
 
 
+------------------+---------------+
| Sex Assigned at  | Date Recorded |
| Birth            |               |
+------------------+---------------+
| Not on file      |               |
+------------------+---------------+
 
 
 
+----------------+-------------+-------------+
| Job Start Date | Occupation  | Industry    |
+----------------+-------------+-------------+
| Not on file    | Not on file | Not on file |
+----------------+-------------+-------------+
 
 
 
+----------------+--------------+------------+
| Travel History | Travel Start | Travel End |
+----------------+--------------+------------+
 
 
 
 
+-------------------------------------+
| No recent travel history available. |
+-------------------------------------+
 documented as of this encounter
 
 Plan of Treatment
 Not on filedocumented as of this encounter
 
 Visit Diagnoses
 Not on filedocumented in this encounter

## 2020-06-07 NOTE — XMS
Encounter Summary
  Created on: 2020
 
 Yony Twin Zane
 External Reference #: [id number]
 : 88
 Sex: Male
 
 Demographics
 
 
+-----------------------+--------------------+
| Address               | [address]          |
|                       | [place]  [address] |
+-----------------------+--------------------+
| Home Phone            | [phone]    |
+-----------------------+--------------------+
| Preferred Language    | Unknown            |
+-----------------------+--------------------+
| Marital Status        |             |
+-----------------------+--------------------+
| Amish Affiliation | Unknown            |
+-----------------------+--------------------+
| Race                  | Unknown            |
+-----------------------+--------------------+
| Ethnic Group          | Unknown            |
+-----------------------+--------------------+
 
 
 Author
 
 
+--------------+--------------------------------------------+
| Author       | [facility]  |
|              | and [place]                                |
+--------------+--------------------------------------------+
| Organization | [facility]  |
|              | and [place]                                |
+--------------+--------------------------------------------+
| Address      | Unknown                                    |
+--------------+--------------------------------------------+
| Phone        | Unavailable                                |
+--------------+--------------------------------------------+
 
 
 
 Support
 
 
+------------------+--------------+--------------------+-----------------+
| Name             | Relationship | Address            | Phone           |
+------------------+--------------+--------------------+-----------------+
| Glenda Bhakta | ECON         | [address]          | [phone] |
|                  |              | [place]  [address] |                 |
+------------------+--------------+--------------------+-----------------+
 
 
 
 
 Care Team Providers
 
 
+-----------------------+------+-----------------+
| Care Team Member Name | Role | Phone           |
+-----------------------+------+-----------------+
| Brittney Wall MD | PCP  | [phone] |
+-----------------------+------+-----------------+
 
 
 
 Reason for Visit
 
 
+-----------+---------------------------------+
| Reason    | Comments                        |
+-----------+---------------------------------+
| Follow-up | margaux fields's appointment 10/7 |
+-----------+---------------------------------+
 
 
 
 Encounter Details
 
 
+--------+-----------+---------------------+----------------------+----------------------+
| Date   | Type      | Department          | Care Team            | Description          |
+--------+-----------+---------------------+----------------------+----------------------+
| 10/07/ | Telephone |   [facility]     |   Ebony Mccloud,   | Follow-up (cancel    |
|    |           | NEUROLOGY  [address]     | [address]  [address]    | today's appointment  |
|        |           | [address]   | [address]       | 10/7)                |
|        |           | [place], [place]        | [place] [address]  |                      |
|        |           | [address]          |  [phone]        |                      |
|        |           | [phone]        | [phone] (Fax)   |                      |
+--------+-----------+---------------------+----------------------+----------------------+
 
 
 
 Social History
 
 
+---------------+-------+-----------+--------+------+
| Tobacco Use   | Types | Packs/Day | Years  | Date |
|               |       |           | Used   |      |
+---------------+-------+-----------+--------+------+
| Former Smoker |       |           |        |      |
+---------------+-------+-----------+--------+------+
 
 
 
+------------------+---------------+
| Sex Assigned at  | Date Recorded |
| Birth            |               |
+------------------+---------------+
| Not on file      |               |
+------------------+---------------+
 
 
 
+----------------+-------------+-------------+
 
| Job Start Date | Occupation  | Industry    |
+----------------+-------------+-------------+
| Not on file    | Not on file | Not on file |
+----------------+-------------+-------------+
 
 
 
+----------------+--------------+------------+
| Travel History | Travel Start | Travel End |
+----------------+--------------+------------+
 
 
 
+-------------------------------------+
| No recent travel history available. |
+-------------------------------------+
 documented as of this encounter
 
 Plan of Treatment
 Not on filedocumented as of this encounter
 
 Visit Diagnoses
 Not on filedocumented in this encounter

## 2020-06-07 NOTE — XMS
Encounter Summary
  Created on: 2020
 
 Yony Twin Zane
 External Reference #: [id number]
 : 88
 Sex: Male
 
 Demographics
 
 
+-----------------------+--------------------+
| Address               | [address]          |
|                       | [place]  [address] |
+-----------------------+--------------------+
| Home Phone            | [phone]    |
+-----------------------+--------------------+
| Preferred Language    | Unknown            |
+-----------------------+--------------------+
| Marital Status        |             |
+-----------------------+--------------------+
| Yarsani Affiliation | Unknown            |
+-----------------------+--------------------+
| Race                  | Unknown            |
+-----------------------+--------------------+
| Ethnic Group          | Unknown            |
+-----------------------+--------------------+
 
 
 Author
 
 
+--------------+--------------------------------------------+
| Author       | [facility]  |
|              | and [place]                                |
+--------------+--------------------------------------------+
| Organization | [facility]  |
|              | and [place]                                |
+--------------+--------------------------------------------+
| Address      | Unknown                                    |
+--------------+--------------------------------------------+
| Phone        | Unavailable                                |
+--------------+--------------------------------------------+
 
 
 
 Support
 
 
+------------------+--------------+--------------------+-----------------+
| Name             | Relationship | Address            | Phone           |
+------------------+--------------+--------------------+-----------------+
| Glenda Bhakta | ECON         | [address]          | [phone] |
|                  |              | [place]  [address] |                 |
+------------------+--------------+--------------------+-----------------+
 
 
 
 
 Care Team Providers
 
 
+-----------------------+------+-------------+
| Care Team Member Name | Role | Phone       |
+-----------------------+------+-------------+
 PCP  | Unavailable |
+-----------------------+------+-------------+
 
 
 
 Encounter Details
 
 
+--------+-----------+---------------------+----------------------+-------------+
| Date   | Type      | Department          | Care Team            | Description |
+--------+-----------+---------------------+----------------------+-------------+
| / | Hospital  |   [facility]      |   Noble Owens  |             |
|    | Encounter | HOSPITAL EMERGENCY  | [address]      |             |
|        |           | CENTER  [address]  | [address]        |             |
|        |           | [address] [place]   | [place] [address]     |             |
|        |           | [address]          | [phone]         |             |
|        |           | [phone]        | [phone] (Fax)   |             |
+--------+-----------+---------------------+----------------------+-------------+
 
 
 
 Social History
 
 
+----------------+-------+-----------+--------+------+
| Tobacco Use    | Types | Packs/Day | Years  | Date |
|                |       |           | Used   |      |
+----------------+-------+-----------+--------+------+
| Never Assessed |       |           |        |      |
+----------------+-------+-----------+--------+------+
 
 
 
+------------------+---------------+
| Sex Assigned at  | Date Recorded |
| Birth            |               |
+------------------+---------------+
| Not on file      |               |
+------------------+---------------+
 
 
 
+----------------+-------------+-------------+
| Job Start Date | Occupation  | Industry    |
+----------------+-------------+-------------+
| Not on file    | Not on file | Not on file |
+----------------+-------------+-------------+
 
 
 
+----------------+--------------+------------+
| Travel History | Travel Start | Travel End |
+----------------+--------------+------------+
 
 
 
 
+-------------------------------------+
| No recent travel history available. |
+-------------------------------------+
 documented as of this encounter
 
 Plan of Treatment
 Not on filedocumented as of this encounter
 
 Visit Diagnoses
 Not on filedocumented in this encounter

## 2021-04-15 NOTE — XMS
PreManage Notification: MESSI WILLOUGHBY MRN:[id number]
 
Security Information
 
Security Events
No recent Security Events currently on file
 
 
 
CRITERIA MET
------------
- [facility] - 2 Visits in 30 Days
 
 
CARE PROVIDERS
There are no care providers on record at this time.
 
[facility] has no Care Guidelines for this patient.
 
DEBBIE VISIT COUNT (12 MO.)
-------------------------------------------------------------------------------------
1 [facility]
 
2 [facility]
-------------------------------------------------------------------------------------
TOTAL 3
-------------------------------------------------------------------------------------
NOTE: Visits indicate total known visits.
 
ED/UCC VISIT TRACKING (12 MO.)
-------------------------------------------------------------------------------------
[facility]
 
[facility]
 
COMPLAINT:
- FLANK PAIN, URINE PROBLEM
-------------------------------------------------------------------------------------
06/05/2020 09:55
[facility]
 
TYPE: Emergency
 
COMPLAINT:
- flank pain
 
DIAGNOSES:
- Calculus of ureter
- flank pain
-------------------------------------------------------------------------------------
03[facility]09/2020 11:26
[facility]
 
TYPE: [facility]
 
COMPLAINT:
- HEADACHE
 
 
DIAGNOSES:
- Migraine, unspecified, not intractable, without status migrai
- Headache
-------------------------------------------------------------------------------------
 
 
INPATIENT VISIT TRACKING (12 MO.)
No inpatient visits to display in this time frame
 
[email or web address] Detail [email or web address] Simple